# Patient Record
Sex: FEMALE | Race: WHITE | NOT HISPANIC OR LATINO | Employment: OTHER | URBAN - METROPOLITAN AREA
[De-identification: names, ages, dates, MRNs, and addresses within clinical notes are randomized per-mention and may not be internally consistent; named-entity substitution may affect disease eponyms.]

---

## 2017-09-12 ENCOUNTER — CLINICAL SUPPORT (OUTPATIENT)
Dept: AUDIOLOGY | Facility: CLINIC | Age: 82
End: 2017-09-12
Payer: MEDICARE

## 2017-09-12 DIAGNOSIS — H90.3 SENSORINEURAL HEARING LOSS (SNHL), BILATERAL: Primary | ICD-10-CM

## 2017-09-12 PROCEDURE — 92604 REPROGRAM COCHLEAR IMPLT 7/>: CPT | Mod: S$GLB,,, | Performed by: AUDIOLOGIST

## 2017-09-13 NOTE — PROGRESS NOTES
9/12/2017    Cochlear Implant Programming Session:    Sofya Carmen was seen for a follow up programming session with her N5 cochlear implant sound processor.  Mrs. Carmen reported significant problems with the sound processor not turning on every day.  This was leaving her without sound and very concerned that she would not be able to hear.  The processor was found to be in poor condition.  The wires on the cable were rigid and exposed.  The processor was actually working when she arrived but it could not be turned on again.  A loaner processor was issued today and a replacement will be requested from [employer].  The processor has exceeded it's useful life and requires replacement due to all parts in poor condition.    Impedance testing was completed.  There were no changes to her maps at this time.  A replacement system will be requested from [employer].  The features of the N7 were reviewed today.  A loaner processor was programmed today.  Mrs. Carmen will use the loaner processor until a replacement can be obtained.    Recommend:  1.  Following up programming as needed.  2.  Replacement sound processor system.

## 2017-10-24 ENCOUNTER — CLINICAL SUPPORT (OUTPATIENT)
Dept: AUDIOLOGY | Facility: CLINIC | Age: 82
End: 2017-10-24
Payer: MEDICARE

## 2017-10-24 DIAGNOSIS — H90.3 SENSORINEURAL HEARING LOSS (SNHL), BILATERAL: Primary | ICD-10-CM

## 2017-10-24 DIAGNOSIS — H93.293 IMPAIRMENT, AUDITORY DISCRIMINATION, BILATERAL: ICD-10-CM

## 2017-10-24 PROCEDURE — 92603 COCHLEAR IMPLT F/UP EXAM 7/>: CPT | Mod: S$GLB,,, | Performed by: AUDIOLOGIST

## 2017-10-24 NOTE — PROGRESS NOTES
10/24/2017    Cochlear Implant Programming Session:    Sofya Carmen was seen for a programming session to upgrade her sound processor to the [id number] sound processor.  She was using a #2 magnet and a beige sound processor.  The processor # [id number] was issued with TV streamer # [id number] and MINI KETAN # [id number].  There was no remote control with the system and she does not own an iPhone at this time.      Impedance testing was completed.  The sound processor was set with a new map for use with the [id number] sound processor.    P1=SCAN  P2=Everyday    The TV streamer and MINI KETAN were paired to her sound processor.  Mrs. Carmen was counseled on the use of the wireless accessories and the use of the sound processor.  She may need a remote control to make adjustments and access her wireless accessories.     Recommend:  1.  Follow up programming as necessary.  2.  Annual evaluation.

## 2017-12-06 ENCOUNTER — OUTSIDE PLACE OF SERVICE (OUTPATIENT)
Dept: ADMINISTRATIVE | Facility: OTHER | Age: 82
End: 2017-12-06
Payer: MEDICARE

## 2017-12-06 PROCEDURE — 99232 SBSQ HOSP IP/OBS MODERATE 35: CPT | Mod: ,,, | Performed by: THORACIC SURGERY (CARDIOTHORACIC VASCULAR SURGERY)

## 2017-12-08 ENCOUNTER — OUTSIDE PLACE OF SERVICE (OUTPATIENT)
Dept: ADMINISTRATIVE | Facility: OTHER | Age: 82
End: 2017-12-08
Payer: MEDICARE

## 2017-12-08 PROCEDURE — 33508 ENDOSCOPIC VEIN HARVEST: CPT | Mod: ,,, | Performed by: THORACIC SURGERY (CARDIOTHORACIC VASCULAR SURGERY)

## 2017-12-08 PROCEDURE — 33518 CABG ARTERY-VEIN TWO: CPT | Mod: ,,, | Performed by: THORACIC SURGERY (CARDIOTHORACIC VASCULAR SURGERY)

## 2017-12-08 PROCEDURE — 33533 CABG ARTERIAL SINGLE: CPT | Mod: ,,, | Performed by: THORACIC SURGERY (CARDIOTHORACIC VASCULAR SURGERY)

## 2017-12-14 ENCOUNTER — TELEPHONE (OUTPATIENT)
Dept: VASCULAR SURGERY | Facility: CLINIC | Age: 82
End: 2017-12-14

## 2017-12-14 NOTE — TELEPHONE ENCOUNTER
----- Message from Elicia Lopez sent at 12/14/2017  9:11 AM CST -----  Contact: [phone] patient daughter veena purvis   [phone] patient daughter veena purvis   Calling to schedule post op appt

## 2017-12-27 ENCOUNTER — OFFICE VISIT (OUTPATIENT)
Dept: VASCULAR SURGERY | Facility: CLINIC | Age: 82
End: 2017-12-27
Payer: MEDICARE

## 2017-12-27 VITALS
SYSTOLIC BLOOD PRESSURE: 190 MMHG | WEIGHT: 187.19 LBS | DIASTOLIC BLOOD PRESSURE: 82 MMHG | HEART RATE: 82 BPM | BODY MASS INDEX: 27.73 KG/M2 | HEIGHT: 69 IN

## 2017-12-27 DIAGNOSIS — Z95.1 S/P CABG (CORONARY ARTERY BYPASS GRAFT): ICD-10-CM

## 2017-12-27 DIAGNOSIS — I25.10 CORONARY ARTERY DISEASE INVOLVING NATIVE CORONARY ARTERY OF NATIVE HEART WITHOUT ANGINA PECTORIS: ICD-10-CM

## 2017-12-27 PROCEDURE — 99024 POSTOP FOLLOW-UP VISIT: CPT | Mod: S$GLB,,, | Performed by: THORACIC SURGERY (CARDIOTHORACIC VASCULAR SURGERY)

## 2017-12-27 PROCEDURE — 99999 PR PBB SHADOW E&M-EST. PATIENT-LVL III: CPT | Mod: PBBFAC,,, | Performed by: THORACIC SURGERY (CARDIOTHORACIC VASCULAR SURGERY)

## 2017-12-27 RX ORDER — HYDROCODONE BITARTRATE AND ACETAMINOPHEN 5; 325 MG/1; MG/1
1 TABLET ORAL EVERY 6 HOURS PRN
Qty: 20 TABLET | Refills: 0
Start: 2017-12-27 | End: 2018-10-17

## 2017-12-27 RX ORDER — LISINOPRIL 10 MG/1
TABLET ORAL
Status: ON HOLD | COMMUNITY
Start: 2017-12-13 | End: 2019-10-24 | Stop reason: CLARIF

## 2017-12-27 RX ORDER — NEBIVOLOL HYDROCHLORIDE 2.5 MG/1
TABLET ORAL
Status: ON HOLD | COMMUNITY
Start: 2017-12-13 | End: 2019-10-24 | Stop reason: CLARIF

## 2017-12-27 RX ORDER — DOCUSATE SODIUM 100 MG/1
100 CAPSULE, LIQUID FILLED ORAL DAILY
Refills: 0 | Status: ON HOLD | COMMUNITY
Start: 2017-12-13 | End: 2019-10-24 | Stop reason: CLARIF

## 2017-12-27 RX ORDER — ATORVASTATIN CALCIUM 40 MG/1
TABLET, FILM COATED ORAL
Status: ON HOLD | COMMUNITY
Start: 2017-12-13 | End: 2019-10-24 | Stop reason: CLARIF

## 2017-12-27 RX ORDER — HYDROCODONE BITARTRATE AND ACETAMINOPHEN 5; 325 MG/1; MG/1
TABLET ORAL
COMMUNITY
Start: 2017-12-14 | End: 2017-12-27 | Stop reason: SDUPTHER

## 2017-12-27 RX ORDER — GENTAMICIN SULFATE 3 MG/ML
SOLUTION/ DROPS OPHTHALMIC
Status: ON HOLD | COMMUNITY
Start: 2017-12-22 | End: 2019-10-24 | Stop reason: CLARIF

## 2017-12-27 NOTE — PROGRESS NOTES
HISTORY OF PRESENT ILLNESS:  This is an 82-year-old lady status post coronary   artery bypass grafting.  She returns to the office today in followup.  She has   done overall quite well.  She has no major complaints except for occasional pain   at night, but this is relieved with Norco.  Medicines are noted, they are part   of recent EPIC record.  She does take aspirin and 2 antihypertensives.  They are   overseen by the Cardiology Service.    PHYSICAL EXAMINATION:  Her surgical wounds are clean and dry.  There is no   evidence of infection.    I think at this point she is making good progress.  Her clips and sutures were   all removed.  She can see me on an as needed basis.  I would anticipate a good   long-term outlook, but will defer medical management to the Cardiology Service.      EVANGELISTA  dd: 12/27/2017 14:50:53 (CST)  td: 12/28/2017 07:06:01 (CST)  Doc ID   #[id number]  Job ID #[id number]    CC:

## 2018-10-17 ENCOUNTER — OFFICE VISIT (OUTPATIENT)
Dept: VASCULAR SURGERY | Facility: CLINIC | Age: 83
End: 2018-10-17
Payer: MEDICARE

## 2018-10-17 VITALS
WEIGHT: 195.75 LBS | SYSTOLIC BLOOD PRESSURE: 228 MMHG | DIASTOLIC BLOOD PRESSURE: 91 MMHG | BODY MASS INDEX: 28.99 KG/M2 | HEIGHT: 69 IN | HEART RATE: 78 BPM

## 2018-10-17 DIAGNOSIS — Z95.1 S/P CABG (CORONARY ARTERY BYPASS GRAFT): ICD-10-CM

## 2018-10-17 DIAGNOSIS — Z51.89 VISIT FOR WOUND CHECK: Primary | ICD-10-CM

## 2018-10-17 PROCEDURE — 99213 OFFICE O/P EST LOW 20 MIN: CPT | Mod: PBBFAC,PO | Performed by: THORACIC SURGERY (CARDIOTHORACIC VASCULAR SURGERY)

## 2018-10-17 PROCEDURE — 99213 OFFICE O/P EST LOW 20 MIN: CPT | Mod: S$PBB,,, | Performed by: THORACIC SURGERY (CARDIOTHORACIC VASCULAR SURGERY)

## 2018-10-17 PROCEDURE — 3077F SYST BP >= 140 MM HG: CPT | Mod: CPTII,,, | Performed by: THORACIC SURGERY (CARDIOTHORACIC VASCULAR SURGERY)

## 2018-10-17 PROCEDURE — 1101F PT FALLS ASSESS-DOCD LE1/YR: CPT | Mod: CPTII,,, | Performed by: THORACIC SURGERY (CARDIOTHORACIC VASCULAR SURGERY)

## 2018-10-17 PROCEDURE — 3080F DIAST BP >= 90 MM HG: CPT | Mod: CPTII,,, | Performed by: THORACIC SURGERY (CARDIOTHORACIC VASCULAR SURGERY)

## 2018-10-17 PROCEDURE — 99999 PR PBB SHADOW E&M-EST. PATIENT-LVL III: CPT | Mod: PBBFAC,,, | Performed by: THORACIC SURGERY (CARDIOTHORACIC VASCULAR SURGERY)

## 2018-10-17 RX ORDER — DOXYCYCLINE HYCLATE 100 MG
100 TABLET ORAL EVERY 12 HOURS
Refills: 0 | Status: ON HOLD | COMMUNITY
Start: 2018-10-15 | End: 2019-10-24 | Stop reason: CLARIF

## 2018-10-17 RX ORDER — AMLODIPINE BESYLATE 5 MG/1
5 TABLET ORAL NIGHTLY
Refills: 0 | Status: ON HOLD | COMMUNITY
Start: 2018-10-15 | End: 2019-10-24 | Stop reason: HOSPADM

## 2018-10-17 NOTE — PROGRESS NOTES
OFFICE NOTE    This is an 83-year-old lady who had coronary artery bypass grafting in December 2017.  She developed what appeared to be an abscess along the course of her   sternotomy incision.  She was seen in the Emergency Room and underwent incision   and drainage of a very shallow abscess here.  She returns to the office today in   followup.  Medicines are noted.  She has been on doxycycline.  She had a   culture drawn while in the Emergency Room at [facility].  Medicines are   noted.  Her surgical problems are reviewed as well.    PHYSICAL EXAMINATION:  VITAL SIGNS:  Stable.  She is afebrile.  CHEST:  Clear.  HEART:  Regular rate and rhythm.  ABDOMEN:  Benign.  EXTREMITIES:  Femoral pulses are equal.  Along the course of her sternotomy   incision, she has very shallow short incision at about the mid aspect of the   sternotomy incision.  The dressing was removed that had been placed.  There was   a small amount of purulent material expressed from the wound.  It was probed   with a Q-tip and it did not penetrate down deeper than 0.5 cm and certainly it   does not appear there is any sternal bony involvement.    PLAN:  At this point, she should continue her doxycycline, we dressed the wound   again today, she should dress it on a daily basis and keep it clean and dry.  I   told her to call my office if the wound is not any better by next week and we   will reassess the situation.      EVANGELISTA  dd: 10/17/2018 14:55:48 (CDT)  td: 10/18/2018 02:57:32 (CDT)  Doc ID   #[id number]  Job ID #[id number]    CC:

## 2019-08-15 ENCOUNTER — LAB VISIT (OUTPATIENT)
Dept: LAB | Facility: HOSPITAL | Age: 84
End: 2019-08-15
Attending: INTERNAL MEDICINE
Payer: MEDICARE

## 2019-08-15 DIAGNOSIS — T82.898A ARTERIOVENOUS FISTULA OCCLUSION: Primary | ICD-10-CM

## 2019-08-15 DIAGNOSIS — I10 ESSENTIAL HYPERTENSION, MALIGNANT: ICD-10-CM

## 2019-08-15 DIAGNOSIS — G47.9 REPETITIVE INTRUSIONS OF SLEEP: ICD-10-CM

## 2019-08-15 DIAGNOSIS — E78.5 HYPERLIPEMIA: ICD-10-CM

## 2019-08-15 LAB
ALBUMIN SERPL BCP-MCNC: 4.1 G/DL (ref 3.5–5.2)
ALP SERPL-CCNC: 50 U/L (ref 55–135)
ALT SERPL W/O P-5'-P-CCNC: 12 U/L (ref 10–44)
ANION GAP SERPL CALC-SCNC: 10 MMOL/L (ref 8–16)
AST SERPL-CCNC: 16 U/L (ref 10–40)
BILIRUB SERPL-MCNC: 0.7 MG/DL (ref 0.1–1)
BUN SERPL-MCNC: 15 MG/DL (ref 8–23)
CALCIUM SERPL-MCNC: 9.5 MG/DL (ref 8.7–10.5)
CHLORIDE SERPL-SCNC: 105 MMOL/L (ref 95–110)
CHOLEST SERPL-MCNC: 164 MG/DL (ref 120–199)
CHOLEST/HDLC SERPL: 3.6 {RATIO} (ref 2–5)
CO2 SERPL-SCNC: 24 MMOL/L (ref 23–29)
CREAT SERPL-MCNC: 0.7 MG/DL (ref 0.5–1.4)
ERYTHROCYTE [DISTWIDTH] IN BLOOD BY AUTOMATED COUNT: 14.7 % (ref 11.5–14.5)
EST. GFR  (AFRICAN AMERICAN): >60 ML/MIN/1.73 M^2
EST. GFR  (NON AFRICAN AMERICAN): >60 ML/MIN/1.73 M^2
GLUCOSE SERPL-MCNC: 93 MG/DL (ref 70–110)
HCT VFR BLD AUTO: 44.1 % (ref 37–48.5)
HDLC SERPL-MCNC: 46 MG/DL (ref 40–75)
HDLC SERPL: 28 % (ref 20–50)
HGB BLD-MCNC: 14.2 G/DL (ref 12–16)
LDLC SERPL CALC-MCNC: 104.8 MG/DL (ref 63–159)
MCH RBC QN AUTO: 28.6 PG (ref 27–31)
MCHC RBC AUTO-ENTMCNC: 32.2 G/DL (ref 32–36)
MCV RBC AUTO: 89 FL (ref 82–98)
NONHDLC SERPL-MCNC: 118 MG/DL
PLATELET # BLD AUTO: 238 K/UL (ref 150–350)
PMV BLD AUTO: 10.2 FL (ref 9.2–12.9)
POTASSIUM SERPL-SCNC: 4.1 MMOL/L (ref 3.5–5.1)
PROT SERPL-MCNC: 7.3 G/DL (ref 6–8.4)
RBC # BLD AUTO: 4.96 M/UL (ref 4–5.4)
SODIUM SERPL-SCNC: 139 MMOL/L (ref 136–145)
T4 SERPL-MCNC: 7.9 UG/DL (ref 4.5–11.5)
TRIGL SERPL-MCNC: 66 MG/DL (ref 30–150)
TSH SERPL DL<=0.005 MIU/L-ACNC: 3.84 UIU/ML (ref 0.34–5.6)
WBC # BLD AUTO: 6.68 K/UL (ref 3.9–12.7)

## 2019-08-15 PROCEDURE — 85027 COMPLETE CBC AUTOMATED: CPT

## 2019-08-15 PROCEDURE — 84436 ASSAY OF TOTAL THYROXINE: CPT

## 2019-08-15 PROCEDURE — 36415 COLL VENOUS BLD VENIPUNCTURE: CPT

## 2019-08-15 PROCEDURE — 80053 COMPREHEN METABOLIC PANEL: CPT

## 2019-08-15 PROCEDURE — 84443 ASSAY THYROID STIM HORMONE: CPT

## 2019-08-15 PROCEDURE — 80061 LIPID PANEL: CPT

## 2019-10-23 ENCOUNTER — HOSPITAL ENCOUNTER (OUTPATIENT)
Facility: HOSPITAL | Age: 84
Discharge: HOME | End: 2019-10-24
Attending: EMERGENCY MEDICINE | Admitting: INTERNAL MEDICINE
Payer: MEDICARE

## 2019-10-23 DIAGNOSIS — Z95.1 S/P CABG (CORONARY ARTERY BYPASS GRAFT): Primary | ICD-10-CM

## 2019-10-23 DIAGNOSIS — R07.9 CHEST PAIN: ICD-10-CM

## 2019-10-23 DIAGNOSIS — I25.10 ASCVD (ARTERIOSCLEROTIC CARDIOVASCULAR DISEASE): ICD-10-CM

## 2019-10-23 LAB
BASOPHILS # BLD AUTO: 0.06 K/UL (ref 0–0.2)
BASOPHILS NFR BLD: 0.6 % (ref 0–1.9)
DIFFERENTIAL METHOD: ABNORMAL
EOSINOPHIL # BLD AUTO: 0.3 K/UL (ref 0–0.5)
EOSINOPHIL NFR BLD: 2.7 % (ref 0–8)
ERYTHROCYTE [DISTWIDTH] IN BLOOD BY AUTOMATED COUNT: 15.5 % (ref 11.5–14.5)
HCT VFR BLD AUTO: 42.7 % (ref 37–48.5)
HGB BLD-MCNC: 14.1 G/DL (ref 12–16)
IMM GRANULOCYTES # BLD AUTO: 0.05 K/UL (ref 0–0.04)
IMM GRANULOCYTES NFR BLD AUTO: 0.5 % (ref 0–0.5)
INR PPP: 0.9
LYMPHOCYTES # BLD AUTO: 3.1 K/UL (ref 1–4.8)
LYMPHOCYTES NFR BLD: 31.8 % (ref 18–48)
MCH RBC QN AUTO: 30.3 PG (ref 27–31)
MCHC RBC AUTO-ENTMCNC: 33 G/DL (ref 32–36)
MCV RBC AUTO: 92 FL (ref 82–98)
MONOCYTES # BLD AUTO: 0.8 K/UL (ref 0.3–1)
MONOCYTES NFR BLD: 7.6 % (ref 4–15)
NEUTROPHILS # BLD AUTO: 5.6 K/UL (ref 1.8–7.7)
NEUTROPHILS NFR BLD: 56.8 % (ref 38–73)
NRBC BLD-RTO: 0 /100 WBC
PLATELET # BLD AUTO: 229 K/UL (ref 150–350)
PMV BLD AUTO: 10.8 FL (ref 9.2–12.9)
PROTHROMBIN TIME: 11.9 SEC (ref 10.6–14.8)
RBC # BLD AUTO: 4.66 M/UL (ref 4–5.4)
WBC # BLD AUTO: 9.88 K/UL (ref 3.9–12.7)

## 2019-10-23 PROCEDURE — 85025 COMPLETE CBC W/AUTO DIFF WBC: CPT

## 2019-10-23 PROCEDURE — 84439 ASSAY OF FREE THYROXINE: CPT

## 2019-10-23 PROCEDURE — 84443 ASSAY THYROID STIM HORMONE: CPT

## 2019-10-23 PROCEDURE — 84484 ASSAY OF TROPONIN QUANT: CPT

## 2019-10-23 PROCEDURE — 85610 PROTHROMBIN TIME: CPT

## 2019-10-23 PROCEDURE — 80053 COMPREHEN METABOLIC PANEL: CPT

## 2019-10-23 PROCEDURE — 93005 ELECTROCARDIOGRAM TRACING: CPT

## 2019-10-23 PROCEDURE — 25000003 PHARM REV CODE 250: Performed by: STUDENT IN AN ORGANIZED HEALTH CARE EDUCATION/TRAINING PROGRAM

## 2019-10-23 PROCEDURE — 99285 EMERGENCY DEPT VISIT HI MDM: CPT | Mod: 25

## 2019-10-23 PROCEDURE — 83880 ASSAY OF NATRIURETIC PEPTIDE: CPT

## 2019-10-23 RX ORDER — AMLODIPINE BESYLATE 2.5 MG/1
2.5 TABLET ORAL DAILY
Status: ON HOLD | COMMUNITY
End: 2019-10-24 | Stop reason: HOSPADM

## 2019-10-23 RX ORDER — ASPIRIN 325 MG
325 TABLET ORAL
Status: COMPLETED | OUTPATIENT
Start: 2019-10-23 | End: 2019-10-23

## 2019-10-23 RX ADMIN — ASPIRIN 325 MG ORAL TABLET 325 MG: 325 PILL ORAL at 11:10

## 2019-10-24 ENCOUNTER — CLINICAL SUPPORT (OUTPATIENT)
Dept: CARDIOLOGY | Facility: HOSPITAL | Age: 84
End: 2019-10-24
Attending: SPECIALIST
Payer: MEDICARE

## 2019-10-24 ENCOUNTER — HOSPITAL ENCOUNTER (OUTPATIENT)
Dept: RADIOLOGY | Facility: HOSPITAL | Age: 84
Discharge: HOME | End: 2019-10-24
Attending: SPECIALIST
Payer: MEDICARE

## 2019-10-24 VITALS
TEMPERATURE: 98 F | HEART RATE: 62 BPM | OXYGEN SATURATION: 95 % | HEIGHT: 68 IN | DIASTOLIC BLOOD PRESSURE: 76 MMHG | BODY MASS INDEX: 27.43 KG/M2 | RESPIRATION RATE: 18 BRPM | WEIGHT: 181 LBS | SYSTOLIC BLOOD PRESSURE: 157 MMHG

## 2019-10-24 PROBLEM — R07.9 CHEST PAIN: Status: ACTIVE | Noted: 2019-10-24

## 2019-10-24 PROBLEM — R07.9 CHEST PAIN: Status: RESOLVED | Noted: 2019-10-24 | Resolved: 2019-10-24

## 2019-10-24 PROBLEM — I10 HYPERTENSION: Status: ACTIVE | Noted: 2019-10-24

## 2019-10-24 LAB
ALBUMIN SERPL BCP-MCNC: 4.2 G/DL (ref 3.5–5.2)
ALP SERPL-CCNC: 70 U/L (ref 55–135)
ALT SERPL W/O P-5'-P-CCNC: 14 U/L (ref 10–44)
ANION GAP SERPL CALC-SCNC: 13 MMOL/L (ref 8–16)
AORTIC ROOT ANNULUS: 3.12 CM
AORTIC VALVE CUSP SEPERATION: 0.96 CM
AST SERPL-CCNC: 15 U/L (ref 10–40)
AV INDEX (PROSTH): 0.82
AV MEAN GRADIENT: 5 MMHG
AV PEAK GRADIENT: 10 MMHG
AV VALVE AREA: 2.62 CM2
AV VELOCITY RATIO: 85.4
BILIRUB SERPL-MCNC: 0.8 MG/DL (ref 0.1–1)
BNP SERPL-MCNC: 83 PG/ML (ref 0–99)
BSA FOR ECHO PROCEDURE: 1.98 M2
BUN SERPL-MCNC: 27 MG/DL (ref 8–23)
CALCIUM SERPL-MCNC: 8.8 MG/DL (ref 8.7–10.5)
CHLORIDE SERPL-SCNC: 104 MMOL/L (ref 95–110)
CHOLEST SERPL-MCNC: 199 MG/DL (ref 120–199)
CHOLEST/HDLC SERPL: 3.6 {RATIO} (ref 2–5)
CO2 SERPL-SCNC: 24 MMOL/L (ref 23–29)
CREAT SERPL-MCNC: 1 MG/DL (ref 0.5–1.4)
CV ECHO LV RWT: 0.67 CM
CV PHARM DOSE: 0.4 MG
CV STRESS BASE HR: 87 BPM
DIASTOLIC BLOOD PRESSURE: 78 MMHG
DOP CALC AO PEAK VEL: 1.56 M/S
DOP CALC AO VTI: 32.58 CM
DOP CALC LVOT AREA: 3.2 CM2
DOP CALC LVOT DIAMETER: 2.02 CM
DOP CALC LVOT PEAK VEL: 133.22 M/S
DOP CALC LVOT STROKE VOLUME: 85.23 CM3
DOP CALCLVOT PEAK VEL VTI: 26.61 CM
E WAVE DECELERATION TIME: 314.94 MSEC
E/A RATIO: 0.85
E/E' RATIO: 12.4 M/S
ECHO LV POSTERIOR WALL: 1.11 CM (ref 0.6–1.1)
EST. GFR  (AFRICAN AMERICAN): >60 ML/MIN/1.73 M^2
EST. GFR  (NON AFRICAN AMERICAN): 52.6 ML/MIN/1.73 M^2
FRACTIONAL SHORTENING: 37 % (ref 28–44)
GLUCOSE SERPL-MCNC: 96 MG/DL (ref 70–110)
HDLC SERPL-MCNC: 56 MG/DL (ref 40–75)
HDLC SERPL: 28.1 % (ref 20–50)
INTERVENTRICULAR SEPTUM: 1.11 CM (ref 0.6–1.1)
LDLC SERPL CALC-MCNC: 126.6 MG/DL (ref 63–159)
LEFT ATRIUM SIZE: 2.52 CM
LEFT INTERNAL DIMENSION IN SYSTOLE: 2.08 CM (ref 2.1–4)
LEFT VENTRICLE DIASTOLIC VOLUME INDEX: 47.22 ML/M2
LEFT VENTRICLE DIASTOLIC VOLUME: 92.5 ML
LEFT VENTRICLE MASS INDEX: 56 G/M2
LEFT VENTRICLE SYSTOLIC VOLUME INDEX: 16.6 ML/M2
LEFT VENTRICLE SYSTOLIC VOLUME: 32.6 ML
LEFT VENTRICULAR INTERNAL DIMENSION IN DIASTOLE: 3.3 CM (ref 3.5–6)
LEFT VENTRICULAR MASS: 110.64 G
LV LATERAL E/E' RATIO: 10.33 M/S
LV SEPTAL E/E' RATIO: 15.5 M/S
MV PEAK A VEL: 1.1 M/S
MV PEAK E VEL: 0.93 M/S
NONHDLC SERPL-MCNC: 143 MG/DL
OHS CV CPX 85 PERCENT MAX PREDICTED HEART RATE MALE: 114
OHS CV CPX MAX PREDICTED HEART RATE: 134
OHS CV CPX PATIENT IS FEMALE: 1
OHS CV CPX PATIENT IS MALE: 0
OHS CV CPX PEAK DIASTOLIC BLOOD PRESSURE: 45 MMHG
OHS CV CPX PEAK HEAR RATE: 123 BPM
OHS CV CPX PEAK RATE PRESSURE PRODUCT: NORMAL
OHS CV CPX PEAK SYSTOLIC BLOOD PRESSURE: 167 MMHG
OHS CV CPX PERCENT MAX PREDICTED HEART RATE ACHIEVED: 92
OHS CV CPX RATE PRESSURE PRODUCT PRESENTING: 9396
PISA TR MAX VEL: 2.13 M/S
POTASSIUM SERPL-SCNC: 3.7 MMOL/L (ref 3.5–5.1)
PROT SERPL-MCNC: 7.2 G/DL (ref 6–8.4)
PV PEAK VELOCITY: 80.67 CM/S
RA PRESSURE: 3 MMHG
SODIUM SERPL-SCNC: 141 MMOL/L (ref 136–145)
STRESS ECHO POST EXERCISE DUR MIN: 2 MINUTES
STRESS ECHO POST EXERCISE DUR SEC: 15 SECONDS
STRESS ECHO TARGET HR: 117 BPM
SYSTOLIC BLOOD PRESSURE: 108 MMHG
T4 FREE SERPL-MCNC: 1.19 NG/DL (ref 0.71–1.51)
TDI LATERAL: 0.09 M/S
TDI SEPTAL: 0.06 M/S
TDI: 0.08 M/S
TR MAX PG: 18 MMHG
TRIGL SERPL-MCNC: 82 MG/DL (ref 30–150)
TROPONIN I SERPL DL<=0.01 NG/ML-MCNC: <0.03 NG/ML (ref 0.02–0.04)
TSH SERPL DL<=0.005 MIU/L-ACNC: 11.14 UIU/ML (ref 0.34–5.6)
TV REST PULMONARY ARTERY PRESSURE: 21 MMHG

## 2019-10-24 PROCEDURE — G0378 HOSPITAL OBSERVATION PER HR: HCPCS

## 2019-10-24 PROCEDURE — 93017 CV STRESS TEST TRACING ONLY: CPT

## 2019-10-24 PROCEDURE — A9502 TC99M TETROFOSMIN: HCPCS

## 2019-10-24 PROCEDURE — 25000003 PHARM REV CODE 250: Performed by: NURSE PRACTITIONER

## 2019-10-24 PROCEDURE — 93306 TTE W/DOPPLER COMPLETE: CPT

## 2019-10-24 PROCEDURE — 80061 LIPID PANEL: CPT

## 2019-10-24 PROCEDURE — 25000003 PHARM REV CODE 250: Performed by: STUDENT IN AN ORGANIZED HEALTH CARE EDUCATION/TRAINING PROGRAM

## 2019-10-24 PROCEDURE — 36415 COLL VENOUS BLD VENIPUNCTURE: CPT

## 2019-10-24 PROCEDURE — 84484 ASSAY OF TROPONIN QUANT: CPT

## 2019-10-24 PROCEDURE — 25000003 PHARM REV CODE 250: Performed by: SPECIALIST

## 2019-10-24 PROCEDURE — 63600175 PHARM REV CODE 636 W HCPCS: Performed by: SPECIALIST

## 2019-10-24 RX ORDER — NAPROXEN SODIUM 220 MG/1
81 TABLET, FILM COATED ORAL DAILY
Status: DISCONTINUED | OUTPATIENT
Start: 2019-10-24 | End: 2019-10-24 | Stop reason: HOSPADM

## 2019-10-24 RX ORDER — REGADENOSON 0.08 MG/ML
0.4 INJECTION, SOLUTION INTRAVENOUS ONCE
Status: COMPLETED | OUTPATIENT
Start: 2019-10-24 | End: 2019-10-24

## 2019-10-24 RX ORDER — PANTOPRAZOLE SODIUM 40 MG/1
40 TABLET, DELAYED RELEASE ORAL DAILY
Status: DISCONTINUED | OUTPATIENT
Start: 2019-10-24 | End: 2019-10-24 | Stop reason: HOSPADM

## 2019-10-24 RX ORDER — NITROGLYCERIN 0.4 MG/1
0.4 TABLET SUBLINGUAL EVERY 5 MIN PRN
Qty: 30 TABLET | Refills: 1 | Status: SHIPPED | OUTPATIENT
Start: 2019-10-24 | End: 2021-03-09

## 2019-10-24 RX ORDER — ACETAMINOPHEN 325 MG/1
650 TABLET ORAL EVERY 4 HOURS PRN
Status: DISCONTINUED | OUTPATIENT
Start: 2019-10-24 | End: 2019-10-24 | Stop reason: HOSPADM

## 2019-10-24 RX ORDER — AMLODIPINE BESYLATE 2.5 MG/1
2.5 TABLET ORAL DAILY
Status: DISCONTINUED | OUTPATIENT
Start: 2019-10-24 | End: 2019-10-24 | Stop reason: HOSPADM

## 2019-10-24 RX ORDER — NITROGLYCERIN 0.4 MG/1
0.4 TABLET SUBLINGUAL EVERY 5 MIN PRN
Status: DISCONTINUED | OUTPATIENT
Start: 2019-10-24 | End: 2019-10-24 | Stop reason: HOSPADM

## 2019-10-24 RX ORDER — ONDANSETRON 4 MG/1
8 TABLET, ORALLY DISINTEGRATING ORAL EVERY 8 HOURS PRN
Status: DISCONTINUED | OUTPATIENT
Start: 2019-10-24 | End: 2019-10-24 | Stop reason: HOSPADM

## 2019-10-24 RX ORDER — MORPHINE SULFATE 2 MG/ML
2 INJECTION, SOLUTION INTRAMUSCULAR; INTRAVENOUS EVERY 4 HOURS PRN
Status: DISCONTINUED | OUTPATIENT
Start: 2019-10-24 | End: 2019-10-24 | Stop reason: HOSPADM

## 2019-10-24 RX ORDER — SODIUM CHLORIDE 0.9 % (FLUSH) 0.9 %
10 SYRINGE (ML) INJECTION
Status: DISCONTINUED | OUTPATIENT
Start: 2019-10-24 | End: 2019-10-24 | Stop reason: HOSPADM

## 2019-10-24 RX ORDER — CLOPIDOGREL BISULFATE 75 MG/1
75 TABLET ORAL DAILY
Qty: 30 TABLET | Refills: 1 | Status: SHIPPED | OUTPATIENT
Start: 2019-10-24 | End: 2020-01-14 | Stop reason: CLARIF

## 2019-10-24 RX ORDER — ONDANSETRON 2 MG/ML
4 INJECTION INTRAMUSCULAR; INTRAVENOUS EVERY 8 HOURS PRN
Status: DISCONTINUED | OUTPATIENT
Start: 2019-10-24 | End: 2019-10-24 | Stop reason: HOSPADM

## 2019-10-24 RX ORDER — AMLODIPINE BESYLATE 5 MG/1
5 TABLET ORAL 2 TIMES DAILY
Qty: 60 TABLET | Refills: 1 | Status: SHIPPED | OUTPATIENT
Start: 2019-10-24 | End: 2020-09-08

## 2019-10-24 RX ORDER — NAPROXEN SODIUM 220 MG/1
81 TABLET, FILM COATED ORAL DAILY
Qty: 30 TABLET | Refills: 1 | Status: SHIPPED | OUTPATIENT
Start: 2019-10-24 | End: 2021-03-09

## 2019-10-24 RX ORDER — HYDRALAZINE HYDROCHLORIDE 20 MG/ML
10 INJECTION INTRAMUSCULAR; INTRAVENOUS EVERY 6 HOURS PRN
Status: DISCONTINUED | OUTPATIENT
Start: 2019-10-24 | End: 2019-10-24 | Stop reason: HOSPADM

## 2019-10-24 RX ORDER — AMLODIPINE BESYLATE 5 MG/1
5 TABLET ORAL NIGHTLY
Status: DISCONTINUED | OUTPATIENT
Start: 2019-10-24 | End: 2019-10-24 | Stop reason: HOSPADM

## 2019-10-24 RX ADMIN — REGADENOSON 0.4 MG: 0.08 INJECTION, SOLUTION INTRAVENOUS at 11:10

## 2019-10-24 RX ADMIN — ACETAMINOPHEN 650 MG: 325 TABLET ORAL at 12:10

## 2019-10-24 RX ADMIN — ALUMINUM HYDROXIDE, MAGNESIUM HYDROXIDE, AND SIMETHICONE 50 ML: 200; 200; 20 SUSPENSION ORAL at 12:10

## 2019-10-24 RX ADMIN — PANTOPRAZOLE SODIUM 40 MG: 40 TABLET, DELAYED RELEASE ORAL at 12:10

## 2019-10-24 RX ADMIN — AMLODIPINE BESYLATE 2.5 MG: 2.5 TABLET ORAL at 12:10

## 2019-10-24 RX ADMIN — AMLODIPINE BESYLATE 5 MG: 5 TABLET ORAL at 03:10

## 2019-10-24 NOTE — ED NOTES
"Patient identifiers for Sofya Carmen checked and correct.  LOC:  Sofya Carmen is awake, alert, and aware of environment with an appropriate affect. She is oriented x 3 and speaking appropriately.  APPEARANCE:  She is resting comfortably and in no acute distress. She is clean and well groomed, patient's clothing is properly fastened.  SKIN:  The skin is warm and dry. She has normal skin turgor and moist mucus membranes. Skin is intact; no bruising or breakdown noted.  MUSCULOSKELETAL:  She is moving all extremities well, no obvious deformities noted. Pulses intact.   RESPIRATORY:  Airway is open and patent. Respirations are spontaneous and non-labored with normal effort and rate.  CARDIAC:  She has a normal rate and rhythm with PVCs. Capillary refill < 3 seconds.Pt c/o indigestion prior to arrival. Pt states that "her mom felt the same thing before she  of her massive MI."  ABDOMEN:  No distention noted.  Soft and non-tender upon palpation.  NEUROLOGICAL:  PERRL. Facial expression is symmetrical. Hand grasps are equal bilaterally. Normal sensation in all extremities when touched with finger.  Allergies reported:    Review of patient's allergies indicates:   Allergen Reactions    [place] thyroid  [thyroid (pork)]      Other reaction(s): palpitations     OTHER NOTES:    "

## 2019-10-24 NOTE — SUBJECTIVE & OBJECTIVE
Past Medical History:   Diagnosis Date    Hearing loss, left     White coat hypertension        Past Surgical History:   Procedure Laterality Date    BUNIONECTOMY      EYE SURGERY      cataract    HEMORRHOID SURGERY      SHOULDER SURGERY         Review of patient's allergies indicates:   Allergen Reactions    [place] thyroid  [thyroid (pork)]      Other reaction(s): palpitations       No current facility-administered medications on file prior to encounter.      Current Outpatient Medications on File Prior to Encounter   Medication Sig    amLODIPine (NORVASC) 2.5 MG tablet Take 2.5 mg by mouth once daily.    amLODIPine (NORVASC) 5 MG tablet Take 5 mg by mouth nightly.     atorvastatin (LIPITOR) 40 MG tablet     BYSTOLIC 2.5 mg Tab     docusate sodium (COLACE) 100 MG capsule Take 100 mg by mouth once daily.    doxycycline (VIBRA-TABS) 100 MG tablet Take 100 mg by mouth every 12 (twelve) hours.    gentamicin (GARAMYCIN) 0.3 % ophthalmic solution     lisinopril 10 MG tablet      Family History     Problem Relation (Age of Onset)    Heart disease Mother, Father        Tobacco Use    Smoking status: Never Smoker   Substance and Sexual Activity    Alcohol use: No     Comment: occasionally    Drug use: No    Sexual activity: Not on file     Review of Systems   All other systems reviewed and are negative.    Objective:     Vital Signs (Most Recent):  Temp: 98.5 °F (36.9 °C) (10/23/19 2338)  Pulse: 80 (10/23/19 2338)  Resp: 16 (10/23/19 2338)  BP: (!) 172/74 (10/23/19 2338)  SpO2: 96 % (10/23/19 2338) Vital Signs (24h Range):  Temp:  [98.5 °F (36.9 °C)] 98.5 °F (36.9 °C)  Pulse:  [80] 80  Resp:  [16] 16  SpO2:  [96 %] 96 %  BP: (172)/(74) 172/74     Weight: 81.6 kg (180 lb)  Body mass index is 27.37 kg/m².    Physical Exam   Constitutional: She is oriented to person, place, and time. She appears well-developed and well-nourished. No distress.   HENT:   Head: Normocephalic and atraumatic.   Eyes: Conjunctivae  and EOM are normal.   Neck: Normal range of motion.   Cardiovascular: Normal rate and regular rhythm. Exam reveals no gallop.   No murmur heard.  Pulmonary/Chest: Effort normal and breath sounds normal. She has no wheezes. She has no rales.   Abdominal: Soft.   Genitourinary: Vagina normal.   Musculoskeletal: Normal range of motion. She exhibits no edema or deformity.   Neurological: She is alert and oriented to person, place, and time.   Skin: Skin is warm and dry. Capillary refill takes less than 2 seconds. No rash noted. She is not diaphoretic. No erythema.   Psychiatric: She has a normal mood and affect.   Vitals reviewed.        CRANIAL NERVES     CN III, IV, VI   Extraocular motions are normal.        Significant Labs:   CBC:   Recent Labs   Lab 10/23/19  2327   WBC 9.88   HGB 14.1   HCT 42.7        CMP:   Recent Labs   Lab 10/23/19  2327      K 3.7      CO2 24   GLU 96   BUN 27*   CREATININE 1.0   CALCIUM 8.8   PROT 7.2   ALBUMIN 4.2   BILITOT 0.8   ALKPHOS 70   AST 15   ALT 14   ANIONGAP 13   EGFRNONAA 52.6*     Cardiac Markers:   Recent Labs   Lab 10/23/19  2327   BNP 83     Troponin:   Recent Labs   Lab 10/23/19  2327   TROPONINI <0.030     All pertinent labs within the past 24 hours have been reviewed.    Significant Imaging: I have reviewed and interpreted all pertinent imaging results/findings within the past 24 hours. CXR: No obvious infiltrates, no cardiomegaly, or overt heart failure.    EKG, personally reviewed: SR w frequent PVCs, no ST elevation

## 2019-10-24 NOTE — PLAN OF CARE
10/24/19 1633   GIFFORD Message   Medicare Outpatient and Observation Notification regarding financial responsibility Given to patient/caregiver;Explained to patient/caregiver;Signed/date by patient/caregiver   Date GIFFORD was signed 10/24/19   Time GIFFORD was signed 1600   Patient and  do not agree with being in outpatient/observation status and not being informed before she was placed in a bed. They believe that they should have been given the info about her financial responsibility with outpatient/observation.

## 2019-10-24 NOTE — CONSULTS
[facility]  Cardiology  Consult Note    Patient Name: Sofya Carmen  MRN: [id number]  Admission Date: 10/23/2019  Hospital Length of Stay: 0 days  Code Status: Full Code   Attending Provider: Jean Trivedi MD   Consulting Provider: Joshua Streeter MD  Primary Care Physician: Primary Doctor No  Principal Problem:Chest pain    Patient information was obtained from patient and ER records.     Consults  Subjective:     Chief Complaint:  Chest pain      HPI: epigastric substernal burning pain to throat  Last pm and she came to  ER    pvc noted no acute cghanges ; Pt has   om1 + 3  cabg with vein graft 2 years ago  And jessica to lad-  No Est since   She works hard without cp; bp  Elevated at home .  Not on BB, statin , ace  Or asa due to side effects  ; non smoker     Past Medical History:   Diagnosis Date    Hearing loss, left     White coat hypertension        Past Surgical History:   Procedure Laterality Date    BUNIONECTOMY      EYE SURGERY      cataract    HEMORRHOID SURGERY      SHOULDER SURGERY         Review of patient's allergies indicates:   Allergen Reactions    [place] thyroid  [thyroid (pork)]      Other reaction(s): palpitations       No current facility-administered medications on file prior to encounter.      Current Outpatient Medications on File Prior to Encounter   Medication Sig    amLODIPine (NORVASC) 2.5 MG tablet Take 2.5 mg by mouth once daily.    amLODIPine (NORVASC) 5 MG tablet Take 5 mg by mouth nightly.     [DISCONTINUED] atorvastatin (LIPITOR) 40 MG tablet     [DISCONTINUED] BYSTOLIC 2.5 mg Tab     [DISCONTINUED] docusate sodium (COLACE) 100 MG capsule Take 100 mg by mouth once daily.    [DISCONTINUED] doxycycline (VIBRA-TABS) 100 MG tablet Take 100 mg by mouth every 12 (twelve) hours.    [DISCONTINUED] gentamicin (GARAMYCIN) 0.3 % ophthalmic solution     [DISCONTINUED] lisinopril 10 MG tablet      Family History     Problem Relation (Age of Onset)    Heart disease  Mother, Father        Tobacco Use    Smoking status: Never Smoker   Substance and Sexual Activity    Alcohol use: No     Comment: occasionally    Drug use: No    Sexual activity: Not on file     ROS  Objective:     Vital Signs (Most Recent):  Temp: 98 °F (36.7 °C) (10/24/19 0826)  Pulse: 76 (10/24/19 0826)  Resp: 19 (10/24/19 0826)  BP: (!) 168/75 (10/24/19 0826)  SpO2: 97 % (10/24/19 0826) Vital Signs (24h Range):  Temp:  [97.4 °F (36.3 °C)-98.5 °F (36.9 °C)] 98 °F (36.7 °C)  Pulse:  [60-86] 76  Resp:  [11-19] 19  SpO2:  [95 %-97 %] 97 %  BP: (140-172)/(74-82) 168/75     Weight: 82.1 kg (181 lb)  Body mass index is 27.52 kg/m².    SpO2: 97 %  O2 Device (Oxygen Therapy): room air      Intake/Output Summary (Last 24 hours) at 10/24/2019 1036  Last data filed at 10/24/2019 0600  Gross per 24 hour   Intake 100 ml   Output --   Net 100 ml       Lines/Drains/Airways     Peripheral Intravenous Line                 Peripheral IV - Single Lumen 10/23/19 2328 18 G Left Antecubital less than 1 day                Physical Exam 17-/ 80 bilat x 3  160/80  Left supine frequent premature contractions    neck no bruits lungs clear cor reg  irreg no  m abd + extremities ok  Good pulses     Significant Labs:   CMP   Recent Labs   Lab 10/23/19  2327      K 3.7      CO2 24   GLU 96   BUN 27*   CREATININE 1.0   CALCIUM 8.8   PROT 7.2   ALBUMIN 4.2   BILITOT 0.8   ALKPHOS 70   AST 15   ALT 14   ANIONGAP 13   ESTGFRAFRICA >60.0   EGFRNONAA 52.6*   , CBC   Recent Labs   Lab 10/23/19  2327   WBC 9.88   HGB 14.1   HCT 42.7       and Troponin   Recent Labs   Lab 10/23/19  2327 10/24/19  0549   TROPONINI <0.030 <0.030       Significant Imaging: EKG: pvcs  nsr  inf lat st flattening   Assessment and Plan:   1) ro svg pbs -est and echo today   2) gastritis   3) pvcs    ? Hi bp causing issues   She may need cath   Active Diagnoses:    Diagnosis Date Noted POA    PRINCIPAL PROBLEM:  Chest pain [R07.9] 10/24/2019 Yes     Hypertension [I10] 10/24/2019 Yes      Problems Resolved During this Admission:   I personally reviewed chart and imaging studies ,examined patient, and discussed with the patient her illness and treatment including diet and follow-up care. This consult was prolonged and required approximately 50% time for review and 50% time examining patient and writing notes and orders       VTE Risk Mitigation (From admission, onward)         Ordered     Place sequential compression device  Until discontinued      10/24/19 0118     Reason for No Pharmacological VTE Prophylaxis  Once      10/24/19 0118     IP VTE HIGH RISK PATIENT  Once      10/24/19 0118                Thank you for your consult.     Joshua Streeter MD  Cardiology   [facility]

## 2019-10-24 NOTE — H&P
[facility]  History & Physical  Date of service: 10/24/2019 at 0100    Patient Name: Sofya Carmen  MRN: [id number]  Admission Date: 10/23/2019  Attending Physician: Kaushal Pope MD  Primary Care Provider: Primary Doctor No         Patient information was obtained from patient, past medical records and ER records.     Subjective:     Principal Problem:Chest pain    Chief Complaint:   Chief Complaint   Patient presents with    Chest Pain        HPI: The patient is an 84-year-old female with strong family history of CAD and personal history of CAD/MI- status post CABG in 2017 and hypertension.  She presented to the emergency department with epigastric pain. She states that she had mid epigastric pain, described as heartburn, radiated to right jaw, lasting for a few seconds a couple days ago.  Last night, while watching TV, she experienced severe heart burn/bloating in the epigastric area. She took baking soda water and aspirin with no significant improvement. She subsequently came to the ED. She states that this pain is similar to pain with her last heart attack. She denies shortness of breath, nausea, vomiting, diaphoresis.  She denies radiation or aggravating factors.  Pain lasted for a couple hours and now completely resolved.    Workup in the emergency department was unremarkable.  EKG shows sinus rhythm with frequent PVCs, no ST elevation.  Troponin is negative.  She is noted to be hypertensive with systolic blood pressure in the 170s to 190s.  She states that her blood pressure has been normal at home with her amlodipine.  She states that she is not taking aspirin on a regular basis due to bleeding in her eyes.             Past Medical History:   Diagnosis Date    Hearing loss, left     White coat hypertension        Past Surgical History:   Procedure Laterality Date    BUNIONECTOMY      EYE SURGERY      cataract    HEMORRHOID SURGERY      SHOULDER SURGERY         Review of  patient's allergies indicates:   Allergen Reactions    [place] thyroid  [thyroid (pork)]      Other reaction(s): palpitations       No current facility-administered medications on file prior to encounter.      Current Outpatient Medications on File Prior to Encounter   Medication Sig    amLODIPine (NORVASC) 2.5 MG tablet Take 2.5 mg by mouth once daily.    amLODIPine (NORVASC) 5 MG tablet Take 5 mg by mouth nightly.     atorvastatin (LIPITOR) 40 MG tablet     BYSTOLIC 2.5 mg Tab     docusate sodium (COLACE) 100 MG capsule Take 100 mg by mouth once daily.    doxycycline (VIBRA-TABS) 100 MG tablet Take 100 mg by mouth every 12 (twelve) hours.    gentamicin (GARAMYCIN) 0.3 % ophthalmic solution     lisinopril 10 MG tablet      Family History     Problem Relation (Age of Onset)    Heart disease Mother, Father        Tobacco Use    Smoking status: Never Smoker   Substance and Sexual Activity    Alcohol use: No     Comment: occasionally    Drug use: No    Sexual activity: Not on file     Review of Systems   All other systems reviewed and are negative.    Objective:     Vital Signs (Most Recent):  Temp: 98.5 °F (36.9 °C) (10/23/19 2338)  Pulse: 80 (10/23/19 2338)  Resp: 16 (10/23/19 2338)  BP: (!) 172/74 (10/23/19 2338)  SpO2: 96 % (10/23/19 2338) Vital Signs (24h Range):  Temp:  [98.5 °F (36.9 °C)] 98.5 °F (36.9 °C)  Pulse:  [80] 80  Resp:  [16] 16  SpO2:  [96 %] 96 %  BP: (172)/(74) 172/74     Weight: 81.6 kg (180 lb)  Body mass index is 27.37 kg/m².    Physical Exam   Constitutional: She is oriented to person, place, and time. She appears well-developed and well-nourished. No distress.   HENT:   Head: Normocephalic and atraumatic.   Eyes: Conjunctivae and EOM are normal.   Neck: Normal range of motion.   Cardiovascular: Normal rate and regular rhythm. Exam reveals no gallop.   No murmur heard.  Pulmonary/Chest: Effort normal and breath sounds normal. She has no wheezes. She has no rales.   Abdominal: Soft.    Genitourinary: Vagina normal.   Musculoskeletal: Normal range of motion. She exhibits no edema or deformity.   Neurological: She is alert and oriented to person, place, and time.   Skin: Skin is warm and dry. Capillary refill takes less than 2 seconds. No rash noted. She is not diaphoretic. No erythema.   Psychiatric: She has a normal mood and affect.   Vitals reviewed.        CRANIAL NERVES     CN III, IV, VI   Extraocular motions are normal.        Significant Labs:   CBC:   Recent Labs   Lab 10/23/19  2327   WBC 9.88   HGB 14.1   HCT 42.7        CMP:   Recent Labs   Lab 10/23/19  2327      K 3.7      CO2 24   GLU 96   BUN 27*   CREATININE 1.0   CALCIUM 8.8   PROT 7.2   ALBUMIN 4.2   BILITOT 0.8   ALKPHOS 70   AST 15   ALT 14   ANIONGAP 13   EGFRNONAA 52.6*     Cardiac Markers:   Recent Labs   Lab 10/23/19  2327   BNP 83     Troponin:   Recent Labs   Lab 10/23/19  2327   TROPONINI <0.030     All pertinent labs within the past 24 hours have been reviewed.    Significant Imaging: I have reviewed and interpreted all pertinent imaging results/findings within the past 24 hours. CXR: No obvious infiltrates, no cardiomegaly, or overt heart failure.    EKG, personally reviewed: SR w frequent PVCs, no ST elevation    Assessment/Plan:     * Chest pain  Cardiac monitor for arrhythmia  Trend cardiac enzymes  EKG p.r.n.  Add aspirin 81 mg daily  Nitroglycerin p.r.n.  Consult Cardiology      Hypertension  Monitor  Resume home medication, amlodipine 2.5 mg in a.m. and 5 mg in p.m.  Hydralazine 10 mg IV p.r.n. for systolic blood pressure above 180      VTE Risk Mitigation (From admission, onward)         Ordered     Place sequential compression device  Until discontinued      10/24/19 0118     Reason for No Pharmacological VTE Prophylaxis  Once      10/24/19 0118     IP VTE HIGH RISK PATIENT  Once      10/24/19 0118                   ANNY Trejo  Department of Hospital Medicine   [facility]  [facility]

## 2019-10-24 NOTE — PROGRESS NOTES
@  10:52      PATIENT INJECTED WITH      10mCi Tc99m MYOVIEW IV FOR RESTING IMAGES.    REMAIN NPO STATUS.                     S.C.,JAZMINMT

## 2019-10-24 NOTE — ASSESSMENT & PLAN NOTE
Monitor  Resume home medication, amlodipine 2.5 mg in a.m. and 5 mg in p.m.  Hydralazine 10 mg IV p.r.n. for systolic blood pressure above 180

## 2019-10-24 NOTE — PROGRESS NOTES
@    13:20    OBTAINED STRESS IMAGES.    @ 14:00       NUCLEAR MEDICINE MYOPERFUSION STUDY COMPLETED.    HANY JIMENEZ

## 2019-10-24 NOTE — NURSING
Contacted charge nurse to make aware that pt had complaints about observation status and had many questions/concerns about not being informed in ER upon admission about observation status. Charge nurse states that CM would be best person to answer pts questions, CM states that she has already spoke with family and patient and has already answered questions, CM states to call pt advocate. Pt advocate called who states that there is nothing she is able to do and that directors/supervisors should be contacted, pt advocate states that she will call to cardiology director/supervisors to come speak with pt. Pt and family made aware that advocate and charge nurse were made aware and that supervisors are being contacted to come speak to them.

## 2019-10-24 NOTE — ED PROVIDER NOTES
Encounter Date: 10/23/2019       History     Chief Complaint   Patient presents with    Chest Pain     Chief complaint is indigestion type epigastric pain.  The patient states I felt like this before when I had my previous heart attack the patient yesterday had slight pain lower sternum radiate to the jaw lasting 30 sec a burning sensation.  Today she had a similar complaint took aspirin 1 baby aspirin today.  It was described as bad indigestion she also felt a  bad feeling in her throat and felt a  bloating feeling.  She stated it is almost gone at this point time that started several hours prior to arrival.  She does have a history of bypass surgery 2 years ago.  As I mentioned she took 1 baby aspirin tonight.  She does have a history of hypertension.  No history of diabetes high cholesterol.  She is a nonsmoker.  Family history mom  of an MI at 50 dad  after having his 2nd bypass.        Review of patient's allergies indicates:   Allergen Reactions    [place] thyroid  [thyroid (pork)]      Other reaction(s): palpitations     Past Medical History:   Diagnosis Date    Hearing loss, left     White coat hypertension      Past Surgical History:   Procedure Laterality Date    BUNIONECTOMY      EYE SURGERY      cataract    HEMORRHOID SURGERY      SHOULDER SURGERY       No family history on file.  Social History     Tobacco Use    Smoking status: Never Smoker   Substance Use Topics    Alcohol use: No     Comment: occasionally    Drug use: No     Review of Systems   Constitutional: Negative for chills and fever.   HENT: Negative for ear pain, rhinorrhea and sore throat.    Eyes: Negative for pain and visual disturbance.   Respiratory: Negative for cough and shortness of breath.    Cardiovascular: Positive for chest pain. Negative for palpitations.   Gastrointestinal: Negative for abdominal pain, constipation, diarrhea, nausea and vomiting.   Genitourinary: Negative for dysuria, frequency, hematuria  and urgency.   Musculoskeletal: Negative for back pain, joint swelling and myalgias.   Skin: Negative for rash.   Neurological: Negative for dizziness, seizures, weakness and headaches.   Psychiatric/Behavioral: Negative for dysphoric mood. The patient is not nervous/anxious.        Physical Exam     Initial Vitals   BP Pulse Resp Temp SpO2   -- -- -- -- --      MAP       --         Physical Exam    Nursing note and vitals reviewed.  Constitutional: She appears well-developed and well-nourished.   HENT:   Head: Normocephalic and atraumatic.   Eyes: Conjunctivae, EOM and lids are normal. Pupils are equal, round, and reactive to light.   Neck: Trachea normal and normal range of motion. Neck supple. No thyroid mass and no thyromegaly present.   Cardiovascular: Normal rate, regular rhythm and normal heart sounds.   Pulmonary/Chest: Effort normal and breath sounds normal.   Abdominal: Soft. Normal appearance and bowel sounds are normal. There is no tenderness.   Musculoskeletal: Normal range of motion.   Neurological: She is alert and oriented to person, place, and time. She has normal strength and normal reflexes. No cranial nerve deficit or sensory deficit.   Skin: Skin is warm and dry.   Psychiatric: She has a normal mood and affect. Her speech is normal and behavior is normal. Judgment and thought content normal.         ED Course   Procedures  Labs Reviewed   CBC W/ AUTO DIFFERENTIAL   COMPREHENSIVE METABOLIC PANEL   TROPONIN I   B-TYPE NATRIURETIC PEPTIDE   PROTIME-INR   TSH          Imaging Results          X-Ray Chest AP Portable (In process)                               Attending Attestation:             Attending ED Notes:   The patient labs were reviewed.  I spoke to the hospitalist at 12:55 a.m. and the patient will be admitted.  I spoke to Ms. Rudolph.              Clinical Impression:       ICD-10-CM ICD-9-CM   1. Chest pain R07.9 786.50                                Gisell Cohen MD  10/24/19 0056

## 2019-10-24 NOTE — HPI
The patient is an 84-year-old female with strong family history of CAD and personal history of CAD/MI- status post CABG in 2017 and hypertension.  She presented to the emergency department with epigastric pain. She states that she had mid epigastric pain, described as heartburn, radiated to right jaw, lasting for a few seconds a couple days ago.  Last night, while watching TV, she experienced severe heart burn/bloating in the epigastric area. She took baking soda water and aspirin with no significant improvement. She subsequently came to the ED. She states that this pain is similar to pain with her last heart attack. She denies shortness of breath, nausea, vomiting, diaphoresis.  She denies radiation or aggravating factors.  Pain lasted for a couple hours and now completely resolved.    Workup in the emergency department was unremarkable.  EKG shows sinus rhythm with frequent PVCs, no ST elevation.  Troponin is negative.  She is noted to be hypertensive with systolic blood pressure in the 170s to 190s.  She states that her blood pressure has been normal at home with her amlodipine.  She states that she is not taking aspirin on a regular basis due to bleeding in her eyes.

## 2019-10-24 NOTE — PROGRESS NOTES
@ 12:00    PATIENT INJECTED WITH      26mCi Tc99m MYOVIEW FOR STRESS IMAGES.  LEXISCAN STRESS ADMIN WHILE PATIENT ON TREADMILL STRESS.    MODIFIED CAMELIA STRESS    RELEASE NPO STATUS FROM NUCLEAR MEDICINE.    S.C.,[facility]

## 2019-10-24 NOTE — CONSULTS
[facility]  Cardiology  Progress Note    Patient Name: Sofya Carmen  MRN: [id number]  Admission Date: 10/23/2019  Hospital Length of Stay: 0 days  Code Status: Full Code   Attending Physician: Jean Trivedi MD   Primary Care Physician: Primary Doctor No  Expected Discharge Date: 10/24/2019  Principal Problem:Chest pain    Subjective:     Hospital Course: mpi stress   Normal perfusion but  Transient ischemic dilitation 1.8 ( suggest  Ischemia   Interval History:   ROS  Objective:     Vital Signs (Most Recent):  Temp: 97.9 °F (36.6 °C) (10/24/19 1725)  Pulse: 62 (10/24/19 1725)  Resp: 18 (10/24/19 1725)  BP: (!) 157/76 (10/24/19 1725)  SpO2: 95 % (10/24/19 1725) Vital Signs (24h Range):  Temp:  [97.4 °F (36.3 °C)-98.5 °F (36.9 °C)] 97.9 °F (36.6 °C)  Pulse:  [60-86] 62  Resp:  [11-19] 18  SpO2:  [95 %-98 %] 95 %  BP: (140-172)/(74-84) 157/76     Weight: 82.1 kg (181 lb)  Body mass index is 27.52 kg/m².    SpO2: 95 %  O2 Device (Oxygen Therapy): room air      Intake/Output Summary (Last 24 hours) at 10/24/2019 1852  Last data filed at 10/24/2019 1200  Gross per 24 hour   Intake 340 ml   Output --   Net 340 ml       Lines/Drains/Airways     Peripheral Intravenous Line                 Peripheral IV - Single Lumen 10/23/19 2328 18 G Left Antecubital less than 1 day                Physical Exam    Significant Labs:   CMP   Recent Labs   Lab 10/23/19  2327      K 3.7      CO2 24   GLU 96   BUN 27*   CREATININE 1.0   CALCIUM 8.8   PROT 7.2   ALBUMIN 4.2   BILITOT 0.8   ALKPHOS 70   AST 15   ALT 14   ANIONGAP 13   ESTGFRAFRICA >60.0   EGFRNONAA 52.6*   , CBC   Recent Labs   Lab 10/23/19  2327   WBC 9.88   HGB 14.1   HCT 42.7       and Lipid Panel   Recent Labs   Lab 10/24/19  1422   CHOL 199   HDL 56   LDLCALC 126.6   TRIG 82   CHOLHDL 28.1       Significant Imaging:  Assessment and Plan:    pt does not want to take meds  But after long discussion with her and her son, she agrees to try  plavix ( side effects from asa ) + increase norvasc 5 bid and tng prn and lo chol vegetarian diet    F/u clinic 1-3 weeks  Watch bp   Brief HPI:     Active Diagnoses:    Diagnosis Date Noted POA    Hypertension [I10] 10/24/2019 Yes      Problems Resolved During this Admission:    Diagnosis Date Noted Date Resolved POA    PRINCIPAL PROBLEM:  Chest pain [R07.9] 10/24/2019 10/24/2019 Yes       VTE Risk Mitigation (From admission, onward)         Ordered     Place sequential compression device  Until discontinued      10/24/19 0118     Reason for No Pharmacological VTE Prophylaxis  Once      10/24/19 0118     IP VTE HIGH RISK PATIENT  Once      10/24/19 0118                Joshua Streeter MD  Cardiology  [facility]

## 2019-10-24 NOTE — NURSING
Pt and family remain upset that pt admitted to observation status, supervisor/director/advocate never came to speak with pt. Dr. Trivedi made aware that pt would now like to leave. MD at bedside to speak with pt.

## 2019-10-25 NOTE — DISCHARGE SUMMARY
[facility] Medicine  Discharge Summary      Patient Name: Sofya Carmen  MRN: [id number]  Admission Date: 10/23/2019  Hospital Length of Stay: 0 days  Discharge Date and Time: No discharge date for patient encounter.  Attending Physician: Jean Trivedi MD   Discharging Provider: Jean Trivedi MD  Primary Care Provider: Primary Doctor No      HPI:   The patient is an 84-year-old female with strong family history of CAD and personal history of CAD/MI- status post CABG in 2017 and hypertension.  She presented to the emergency department with epigastric pain. She states that she had mid epigastric pain, described as heartburn, radiated to right jaw, lasting for a few seconds a couple days ago.  Last night, while watching TV, she experienced severe heart burn/bloating in the epigastric area. She took baking soda water and aspirin with no significant improvement. She subsequently came to the ED. She states that this pain is similar to pain with her last heart attack. She denies shortness of breath, nausea, vomiting, diaphoresis.  She denies radiation or aggravating factors.  Pain lasted for a couple hours and now completely resolved.    Workup in the emergency department was unremarkable.  EKG shows sinus rhythm with frequent PVCs, no ST elevation.  Troponin is negative.  She is noted to be hypertensive with systolic blood pressure in the 170s to 190s.  She states that her blood pressure has been normal at home with her amlodipine.  She states that she is not taking aspirin on a regular basis due to bleeding in her eyes.             * No surgery found *      Hospital Course:   Patient admitted with chest pain.  Serial JAZ and stress test negative for ischemia. Patient with cabg in 2017.  Seen by Dr. Streeter who discussed adding plavix and NTG prn- Rx given.  He also recommended increasing Norvasc to 5mg bid- Rx given.  She will follow up in clinic within 3 weeks. Of note, patient was very upset  to learn she was in observation status.  We had a long discussion and I will have the patient advocate contact her (they have left for the day) as I did reassure her we take her complaints seriously.  Return precautions given.     Consults:   Consults (From admission, onward)        Status Ordering Provider     Inpatient consult to Cardiology  Once     Provider:  Willie Barahona MD    Acknowledged MARGARET CAMARILLO new Assessment & Plan notes have been filed under this hospital service since the last note was generated.  Service: Hospital Medicine    Final Active Diagnoses:    Diagnosis Date Noted POA    Hypertension [I10] 10/24/2019 Yes      Problems Resolved During this Admission:    Diagnosis Date Noted Date Resolved POA    PRINCIPAL PROBLEM:  Chest pain [R07.9] 10/24/2019 10/24/2019 Yes       Discharged Condition: good    Disposition: Home or Self Care    Follow Up:  Follow-up Information     Primary Doctor No In 2 weeks.           Cardiology In 1 week.               Patient Instructions:      Diet Cardiac     Notify your health care provider if you experience any of the following:  temperature >100.4     Notify your health care provider if you experience any of the following:  persistent nausea and vomiting or diarrhea     Notify your health care provider if you experience any of the following:  severe uncontrolled pain     Notify your health care provider if you experience any of the following:  increased confusion or weakness     Activity as tolerated       Significant Diagnostic Studies: Labs:   CMP   Recent Labs   Lab 10/23/19  2327      K 3.7      CO2 24   GLU 96   BUN 27*   CREATININE 1.0   CALCIUM 8.8   PROT 7.2   ALBUMIN 4.2   BILITOT 0.8   ALKPHOS 70   AST 15   ALT 14   ANIONGAP 13   ESTGFRAFRICA >60.0   EGFRNONAA 52.6*    and CBC   Recent Labs   Lab 10/23/19  2327   WBC 9.88   HGB 14.1   HCT 42.7          Pending Diagnostic Studies:     Procedure Component Value  Units Date/Time    EKG 12-LEAD [[id number]]     Order Status:  Sent Lab Status:  No result     EKG 12-lead [[id number]]     Order Status:  Sent Lab Status:  No result     Echo Color Flow Doppler? Yes [[id number]]  (Abnormal) Resulted:  10/24/19 1411    Order Status:  Sent Lab Status:  In process Updated:  10/24/19 1415     BSA 1.98 m2      TDI SEPTAL 0.06 m/s      LV LATERAL E/E' RATIO 10.33 m/s      LV SEPTAL E/E' RATIO 15.50 m/s      AORTIC VALVE CUSP SEPERATION 0.96 cm      TDI LATERAL 0.09 m/s      PV PEAK VELOCITY 80.67 cm/s      LVIDD 3.30 cm      IVS 1.11 cm      PW 1.11 cm      Ao root annulus 3.12 cm      LVIDS 2.08 cm      FS 37 %      LV mass 110.64 g      LA size 2.52 cm      Left Ventricle Relative Wall Thickness 0.67 cm      AV mean gradient 5 mmHg      AV valve area 2.62 cm2      AV Velocity Ratio 85.40     AV index (prosthetic) 0.82     E/A ratio 0.85     Mean e' 0.08 m/s      E wave decelartion time 314.94 msec      LVOT diameter 2.02 cm      LVOT area 3.2 cm2      LVOT peak jose rafael 133.22 m/s      LVOT peak VTI 26.61 cm      Ao peak jose rafael 1.56 m/s      Ao VTI 32.58 cm      LVOT stroke volume 85.23 cm3      AV peak gradient 10 mmHg      E/E' ratio 12.40 m/s      MV Peak E Jose Rafael 0.93 m/s      TR Max Jose Rafael 2.13 m/s      MV Peak A Jose Rafael 1.10 m/s      LV Systolic Volume 32.60 mL      LV Systolic Volume Index 16.6 mL/m2      LV Diastolic Volume 92.50 mL      LV Diastolic Volume Index 47.22 mL/m2      LV Mass Index 56 g/m2      Triscuspid Valve Regurgitation Peak Gradient 18 mmHg     Narrative:       · Concentric left ventricular remodeling.       Impression:                Medications:  Reconciled Home Medications:      Medication List      START taking these medications    aspirin 81 MG Chew  Take 1 tablet (81 mg total) by mouth once daily.     clopidogrel 75 mg tablet  Commonly known as:  PLAVIX  Take 1 tablet (75 mg total) by mouth once daily.     nitroGLYCERIN 0.4 MG SL tablet  Commonly known as:  NITROSTAT  Place  1 tablet (0.4 mg total) under the tongue every 5 (five) minutes as needed for Chest pain.        CHANGE how you take these medications    amLODIPine 5 MG tablet  Commonly known as:  NORVASC  Take 1 tablet (5 mg total) by mouth 2 (two) times daily.  What changed:    · when to take this  · Another medication with the same name was removed. Continue taking this medication, and follow the directions you see here.            Indwelling Lines/Drains at time of discharge:   Lines/Drains/Airways     None                 Time spent on the discharge of patient: 31 minutes  Patient was seen and examined on the date of discharge and determined to be suitable for discharge.         Jean Trivedi MD  Department of Hospital Medicine  [facility]

## 2019-10-25 NOTE — NURSING
Pt discharged.     IV and monitor removed per day shift.     Discharge paperwork including new medications and electronically sent prescriptions reviewed with patient and family.     Yaneli Shirley

## 2019-10-25 NOTE — HOSPITAL COURSE
Patient admitted with chest pain.  Serial JAZ and stress test negative for ischemia. Patient with cabg in 2017.  Seen by Dr. Streeter who discussed adding plavix and NTG prn- Rx given.  He also recommended increasing Norvasc to 5mg bid- Rx given.  She will follow up in clinic within 3 weeks. Of note, patient was very upset to learn she was in observation status.  We had a long discussion and I will have the patient advocate contact her (they have left for the day) as I did reassure her we take her complaints seriously.  Return precautions given.

## 2020-01-14 ENCOUNTER — HOSPITAL ENCOUNTER (EMERGENCY)
Facility: HOSPITAL | Age: 85
Discharge: HOME | End: 2020-01-14
Attending: EMERGENCY MEDICINE
Payer: MEDICARE

## 2020-01-14 VITALS
SYSTOLIC BLOOD PRESSURE: 167 MMHG | RESPIRATION RATE: 20 BRPM | HEART RATE: 64 BPM | DIASTOLIC BLOOD PRESSURE: 74 MMHG | BODY MASS INDEX: 29.1 KG/M2 | HEIGHT: 68 IN | WEIGHT: 192 LBS | TEMPERATURE: 99 F | OXYGEN SATURATION: 94 %

## 2020-01-14 DIAGNOSIS — K57.92 ACUTE DIVERTICULITIS: Primary | ICD-10-CM

## 2020-01-14 LAB
ALBUMIN SERPL BCP-MCNC: 3.9 G/DL (ref 3.5–5.2)
ALP SERPL-CCNC: 48 U/L (ref 55–135)
ALT SERPL W/O P-5'-P-CCNC: 18 U/L (ref 10–44)
ANION GAP SERPL CALC-SCNC: 11 MMOL/L (ref 8–16)
AST SERPL-CCNC: 16 U/L (ref 10–40)
BACTERIA #/AREA URNS HPF: ABNORMAL /HPF
BASOPHILS # BLD AUTO: 0.06 K/UL (ref 0–0.2)
BASOPHILS NFR BLD: 0.6 % (ref 0–1.9)
BILIRUB SERPL-MCNC: 0.6 MG/DL (ref 0.1–1)
BILIRUB UR QL STRIP: NEGATIVE
BUN SERPL-MCNC: 16 MG/DL (ref 8–23)
CALCIUM SERPL-MCNC: 8.7 MG/DL (ref 8.7–10.5)
CHLORIDE SERPL-SCNC: 103 MMOL/L (ref 95–110)
CLARITY UR: CLEAR
CO2 SERPL-SCNC: 23 MMOL/L (ref 23–29)
COLOR UR: YELLOW
CREAT SERPL-MCNC: 0.8 MG/DL (ref 0.5–1.4)
DIFFERENTIAL METHOD: NORMAL
EOSINOPHIL # BLD AUTO: 0.3 K/UL (ref 0–0.5)
EOSINOPHIL NFR BLD: 2.6 % (ref 0–8)
ERYTHROCYTE [DISTWIDTH] IN BLOOD BY AUTOMATED COUNT: 13.9 % (ref 11.5–14.5)
EST. GFR  (AFRICAN AMERICAN): >60 ML/MIN/1.73 M^2
EST. GFR  (NON AFRICAN AMERICAN): >60 ML/MIN/1.73 M^2
GLUCOSE SERPL-MCNC: 103 MG/DL (ref 70–110)
GLUCOSE UR QL STRIP: NEGATIVE
HCT VFR BLD AUTO: 40.5 % (ref 37–48.5)
HGB BLD-MCNC: 13.1 G/DL (ref 12–16)
HGB UR QL STRIP: ABNORMAL
IMM GRANULOCYTES # BLD AUTO: 0.03 K/UL (ref 0–0.04)
IMM GRANULOCYTES NFR BLD AUTO: 0.3 % (ref 0–0.5)
KETONES UR QL STRIP: ABNORMAL
LEUKOCYTE ESTERASE UR QL STRIP: ABNORMAL
LIPASE SERPL-CCNC: 25 U/L (ref 4–60)
LYMPHOCYTES # BLD AUTO: 2.3 K/UL (ref 1–4.8)
LYMPHOCYTES NFR BLD: 24.2 % (ref 18–48)
MCH RBC QN AUTO: 29.7 PG (ref 27–31)
MCHC RBC AUTO-ENTMCNC: 32.3 G/DL (ref 32–36)
MCV RBC AUTO: 92 FL (ref 82–98)
MICROSCOPIC COMMENT: ABNORMAL
MONOCYTES # BLD AUTO: 0.8 K/UL (ref 0.3–1)
MONOCYTES NFR BLD: 7.9 % (ref 4–15)
NEUTROPHILS # BLD AUTO: 6.1 K/UL (ref 1.8–7.7)
NEUTROPHILS NFR BLD: 64.4 % (ref 38–73)
NITRITE UR QL STRIP: NEGATIVE
NRBC BLD-RTO: 0 /100 WBC
PH UR STRIP: 5 [PH] (ref 5–8)
PLATELET # BLD AUTO: 242 K/UL (ref 150–350)
PMV BLD AUTO: 10.8 FL (ref 9.2–12.9)
POTASSIUM SERPL-SCNC: 3.9 MMOL/L (ref 3.5–5.1)
PROT SERPL-MCNC: 7 G/DL (ref 6–8.4)
PROT UR QL STRIP: NEGATIVE
RBC # BLD AUTO: 4.41 M/UL (ref 4–5.4)
RBC #/AREA URNS HPF: 4 /HPF (ref 0–4)
SODIUM SERPL-SCNC: 137 MMOL/L (ref 136–145)
SP GR UR STRIP: 1.01 (ref 1–1.03)
URN SPEC COLLECT METH UR: ABNORMAL
UROBILINOGEN UR STRIP-ACNC: NEGATIVE EU/DL
WBC # BLD AUTO: 9.54 K/UL (ref 3.9–12.7)
WBC #/AREA URNS HPF: 11 /HPF (ref 0–5)

## 2020-01-14 PROCEDURE — 96368 THER/DIAG CONCURRENT INF: CPT

## 2020-01-14 PROCEDURE — 99285 EMERGENCY DEPT VISIT HI MDM: CPT | Mod: 25

## 2020-01-14 PROCEDURE — 81000 URINALYSIS NONAUTO W/SCOPE: CPT

## 2020-01-14 PROCEDURE — 96375 TX/PRO/DX INJ NEW DRUG ADDON: CPT

## 2020-01-14 PROCEDURE — 25000003 PHARM REV CODE 250: Performed by: FAMILY MEDICINE

## 2020-01-14 PROCEDURE — 63600175 PHARM REV CODE 636 W HCPCS: Performed by: FAMILY MEDICINE

## 2020-01-14 PROCEDURE — 74177 CT ABD & PELVIS W/CONTRAST: CPT | Mod: TC

## 2020-01-14 PROCEDURE — 85025 COMPLETE CBC W/AUTO DIFF WBC: CPT

## 2020-01-14 PROCEDURE — 74177 CT ABDOMEN PELVIS WITH CONTRAST: ICD-10-PCS | Mod: 26,,, | Performed by: RADIOLOGY

## 2020-01-14 PROCEDURE — 96361 HYDRATE IV INFUSION ADD-ON: CPT

## 2020-01-14 PROCEDURE — 80053 COMPREHEN METABOLIC PANEL: CPT

## 2020-01-14 PROCEDURE — 87086 URINE CULTURE/COLONY COUNT: CPT

## 2020-01-14 PROCEDURE — 74177 CT ABD & PELVIS W/CONTRAST: CPT | Mod: 26,,, | Performed by: RADIOLOGY

## 2020-01-14 PROCEDURE — S0030 INJECTION, METRONIDAZOLE: HCPCS | Performed by: FAMILY MEDICINE

## 2020-01-14 PROCEDURE — 83690 ASSAY OF LIPASE: CPT

## 2020-01-14 PROCEDURE — 25500020 PHARM REV CODE 255: Performed by: EMERGENCY MEDICINE

## 2020-01-14 PROCEDURE — 96365 THER/PROPH/DIAG IV INF INIT: CPT

## 2020-01-14 PROCEDURE — 63600175 PHARM REV CODE 636 W HCPCS: Performed by: EMERGENCY MEDICINE

## 2020-01-14 RX ORDER — CIPROFLOXACIN 500 MG/1
500 TABLET ORAL 2 TIMES DAILY
Qty: 14 TABLET | Refills: 0 | Status: SHIPPED | OUTPATIENT
Start: 2020-01-14 | End: 2020-01-21

## 2020-01-14 RX ORDER — METRONIDAZOLE 500 MG/1
500 TABLET ORAL 3 TIMES DAILY
Qty: 15 TABLET | Refills: 0 | Status: SHIPPED | OUTPATIENT
Start: 2020-01-14 | End: 2020-01-19

## 2020-01-14 RX ORDER — METRONIDAZOLE 500 MG/100ML
500 INJECTION, SOLUTION INTRAVENOUS
Status: COMPLETED | OUTPATIENT
Start: 2020-01-14 | End: 2020-01-14

## 2020-01-14 RX ORDER — ONDANSETRON 2 MG/ML
4 INJECTION INTRAMUSCULAR; INTRAVENOUS
Status: COMPLETED | OUTPATIENT
Start: 2020-01-14 | End: 2020-01-14

## 2020-01-14 RX ORDER — MORPHINE SULFATE 4 MG/ML
4 INJECTION, SOLUTION INTRAMUSCULAR; INTRAVENOUS
Status: COMPLETED | OUTPATIENT
Start: 2020-01-14 | End: 2020-01-14

## 2020-01-14 RX ADMIN — MORPHINE SULFATE 4 MG: 4 INJECTION INTRAVENOUS at 05:01

## 2020-01-14 RX ADMIN — SODIUM CHLORIDE 1000 ML: 0.9 INJECTION, SOLUTION INTRAVENOUS at 05:01

## 2020-01-14 RX ADMIN — ONDANSETRON 4 MG: 2 INJECTION INTRAMUSCULAR; INTRAVENOUS at 05:01

## 2020-01-14 RX ADMIN — METRONIDAZOLE 500 MG: 500 INJECTION, SOLUTION INTRAVENOUS at 08:01

## 2020-01-14 RX ADMIN — IOHEXOL 75 ML: 350 INJECTION, SOLUTION INTRAVENOUS at 06:01

## 2020-01-14 RX ADMIN — CEFTRIAXONE 1 G: 1 INJECTION, SOLUTION INTRAVENOUS at 08:01

## 2020-01-14 NOTE — ED PROVIDER NOTES
Encounter Date: 1/14/2020       History     Chief Complaint   Patient presents with    Abdominal Pain     Patient complaining of abdominal pain since Sunday, also has had diarrhea.     The patient is an 84-year-old female who has a history of chronic hearing loss who presents with suprapubic abdominal pain that began two days ago.  She describes the pain as a burning and bloating sensation.  She has no urinary symptoms such as dysuria, frequency, or hematuria.  She has associated tenderness in this area.  She denies fever and chills. She has nausea but has not vomited.  She had diarrhea for three days last week that resolved without treatment.  She denies diarrhea at present.  She reports no darkened or bloody stools.  She denies headache. She felt lightheaded with ambulation earlier.  She denies chest pain, palpitations, cough, and shortness of breath. She has not taking any medications to attempt treatment.    The history is provided by the patient. No  was used.     Review of patient's allergies indicates:   Allergen Reactions    [place] thyroid  [thyroid (pork)]      Other reaction(s): palpitations     Past Medical History:   Diagnosis Date    Hearing loss, left     White coat hypertension      Past Surgical History:   Procedure Laterality Date    BUNIONECTOMY      EYE SURGERY      cataract    HEMORRHOID SURGERY      SHOULDER SURGERY       Family History   Problem Relation Age of Onset    Heart disease Mother     Heart disease Father      Social History     Tobacco Use    Smoking status: Never Smoker   Substance Use Topics    Alcohol use: No     Comment: occasionally    Drug use: No     Review of Systems   Constitutional: Positive for fatigue. Negative for chills and fever.   HENT: Negative for sore throat and trouble swallowing.    Eyes: Negative for visual disturbance.   Respiratory: Negative for cough and shortness of breath.    Cardiovascular: Negative for chest pain,  palpitations and leg swelling.   Gastrointestinal: Positive for abdominal pain and nausea. Negative for blood in stool, constipation, diarrhea and vomiting.   Endocrine: Negative for polydipsia and polyuria.   Genitourinary: Negative for dysuria, frequency and hematuria.   Musculoskeletal: Negative for back pain.   Skin: Negative for rash.   Neurological: Negative for dizziness, syncope, light-headedness and headaches.       Physical Exam     Initial Vitals [01/14/20 1646]   BP Pulse Resp Temp SpO2   (!) 193/104 90 20 98.6 °F (37 °C) 97 %      MAP       --         Physical Exam    Nursing note and vitals reviewed.  Constitutional: She appears well-developed and well-nourished. She is not diaphoretic. No distress.   HENT:   Head: Normocephalic and atraumatic.   Mouth/Throat: Oropharynx is clear and moist.   Eyes: Conjunctivae are normal. No scleral icterus.   Neck: No JVD present.   Cardiovascular: Normal rate, regular rhythm and normal heart sounds. Exam reveals no gallop and no friction rub.    No murmur heard.  Pulmonary/Chest: Effort normal and breath sounds normal. No stridor. No respiratory distress. She has no decreased breath sounds. She has no wheezes. She has no rhonchi. She has no rales.   Abdominal: Soft. She exhibits no distension. There is tenderness in the suprapubic area and left lower quadrant. There is guarding. There is no CVA tenderness.   Musculoskeletal:   No step-offs or other visible/palpable deformities throughout the thoracic and lumbar spine.  No spinous process, paravertebral, or other areas of tenderness. No overlying rashes or lesions. No pain with normal movements of the back.   Neurological: She is alert and oriented to person, place, and time. GCS score is 15. GCS eye subscore is 4. GCS verbal subscore is 5. GCS motor subscore is 6.   Skin: Skin is warm and dry. No pallor.         ED Course   Procedures  Labs Reviewed - No data to display       Imaging Results    None                                           Clinical Impression:       ICD-10-CM ICD-9-CM   1. Acute diverticulitis K57.92 562.11                             Raman Chua MD  01/14/20 2026

## 2020-01-15 LAB — BACTERIA UR CULT: NORMAL

## 2020-01-15 NOTE — ED NOTES
Patient updated in plan of care, she verbalizes understanding. No immediate needs voiced at this time.

## 2020-01-18 ENCOUNTER — NURSE TRIAGE (OUTPATIENT)
Dept: ADMINISTRATIVE | Facility: CLINIC | Age: 85
End: 2020-01-18

## 2020-01-18 NOTE — TELEPHONE ENCOUNTER
Tuesday night went to ED in [facility]. Was diagnosed with diverticulitis and given cipro, flagyl. Has developed diarrhea.  Diarrhea started 2 days ago, taking pepto bismol  Still having many loose stools (has had about 8 already today)  Drinking fluids, urinating regularly.  Reason for Disposition   [1] SEVERE diarrhea (e.g., 7 or more times / day more than normal) AND [2]  age > 60 years    Additional Information   Negative: Shock suspected (e.g., cold/pale/clammy skin, too weak to stand, low BP, rapid pulse)   Negative: Difficult to awaken or acting confused (e.g., disoriented, slurred speech)   Negative: Sounds like a life-threatening emergency to the triager   Negative: Vomiting also present and worse than the diarrhea   Negative: [1] Blood in stool AND [2] without diarrhea   Negative: Diarrhea in a cancer patient who is currently (or recently) receiving chemotherapy or radiation therapy, or cancer patient who has metastatic or end-stage cancer and is receiving palliative care   Negative: [1] SEVERE abdominal pain (e.g., excruciating) AND [2] present > 1 hour   Negative: [1] SEVERE abdominal pain AND [2] age > 60   Negative: [1] Blood in the stool AND [2] moderate or large amount of blood   Negative: Black or tarry bowel movements  (Exception: chronic-unchanged  black-grey bowel movements AND is taking iron pills or Pepto-bismol)   Negative: [1] Drinking very little AND [2] dehydration suspected (e.g., no urine > 12 hours, very dry mouth, very lightheaded)   Negative: Patient sounds very sick or weak to the triager    Protocols used: DIARRHEA-A-AH

## 2020-01-23 ENCOUNTER — LAB VISIT (OUTPATIENT)
Dept: LAB | Facility: HOSPITAL | Age: 85
End: 2020-01-23
Attending: FAMILY MEDICINE
Payer: MEDICARE

## 2020-01-23 DIAGNOSIS — K57.92 DIVERTICULITIS: Primary | ICD-10-CM

## 2020-01-23 PROCEDURE — 87046 STOOL CULTR AEROBIC BACT EA: CPT

## 2020-01-23 PROCEDURE — 87209 SMEAR COMPLEX STAIN: CPT

## 2020-01-23 PROCEDURE — 87427 SHIGA-LIKE TOXIN AG IA: CPT

## 2020-01-23 PROCEDURE — 87045 FECES CULTURE AEROBIC BACT: CPT

## 2020-01-24 LAB
O+P STL TRI STN: NORMAL

## 2020-01-27 LAB — BACTERIA STL CULT: NORMAL

## 2020-02-17 ENCOUNTER — LAB VISIT (OUTPATIENT)
Dept: LAB | Facility: HOSPITAL | Age: 85
End: 2020-02-17
Attending: PSYCHIATRY & NEUROLOGY
Payer: MEDICARE

## 2020-02-17 DIAGNOSIS — I10 ESSENTIAL HYPERTENSION, MALIGNANT: Primary | ICD-10-CM

## 2020-02-17 LAB
ANION GAP SERPL CALC-SCNC: 10 MMOL/L (ref 8–16)
BUN SERPL-MCNC: 20 MG/DL (ref 8–23)
CALCIUM SERPL-MCNC: 8.8 MG/DL (ref 8.7–10.5)
CHLORIDE SERPL-SCNC: 104 MMOL/L (ref 95–110)
CO2 SERPL-SCNC: 26 MMOL/L (ref 23–29)
CREAT SERPL-MCNC: 0.8 MG/DL (ref 0.5–1.4)
ERYTHROCYTE [DISTWIDTH] IN BLOOD BY AUTOMATED COUNT: 14.5 % (ref 11.5–14.5)
EST. GFR  (AFRICAN AMERICAN): >60 ML/MIN/1.73 M^2
EST. GFR  (NON AFRICAN AMERICAN): >60 ML/MIN/1.73 M^2
GLUCOSE SERPL-MCNC: 114 MG/DL (ref 70–110)
HCT VFR BLD AUTO: 40 % (ref 37–48.5)
HGB BLD-MCNC: 12.8 G/DL (ref 12–16)
MCH RBC QN AUTO: 29.4 PG (ref 27–31)
MCHC RBC AUTO-ENTMCNC: 32 G/DL (ref 32–36)
MCV RBC AUTO: 92 FL (ref 82–98)
PLATELET # BLD AUTO: 236 K/UL (ref 150–350)
PMV BLD AUTO: 10.2 FL (ref 9.2–12.9)
POTASSIUM SERPL-SCNC: 4.1 MMOL/L (ref 3.5–5.1)
RBC # BLD AUTO: 4.36 M/UL (ref 4–5.4)
SODIUM SERPL-SCNC: 140 MMOL/L (ref 136–145)
WBC # BLD AUTO: 7.25 K/UL (ref 3.9–12.7)

## 2020-02-17 PROCEDURE — 36415 COLL VENOUS BLD VENIPUNCTURE: CPT

## 2020-02-17 PROCEDURE — 80048 BASIC METABOLIC PNL TOTAL CA: CPT

## 2020-02-17 PROCEDURE — 85027 COMPLETE CBC AUTOMATED: CPT

## 2020-02-21 ENCOUNTER — HOSPITAL ENCOUNTER (OUTPATIENT)
Dept: CARDIOLOGY | Facility: HOSPITAL | Age: 85
Discharge: HOME | End: 2020-02-21
Attending: PSYCHIATRY & NEUROLOGY
Payer: MEDICARE

## 2020-02-21 DIAGNOSIS — I10 HYPERTENSION: ICD-10-CM

## 2020-02-21 DIAGNOSIS — I10 HYPERTENSION: Primary | ICD-10-CM

## 2020-02-21 PROCEDURE — 93005 ELECTROCARDIOGRAM TRACING: CPT

## 2020-06-14 ENCOUNTER — HOSPITAL ENCOUNTER (OUTPATIENT)
Facility: HOSPITAL | Age: 85
Discharge: HOME | End: 2020-06-15
Attending: EMERGENCY MEDICINE | Admitting: INTERNAL MEDICINE
Payer: MEDICARE

## 2020-06-14 DIAGNOSIS — R07.9 CHEST PAIN, UNSPECIFIED TYPE: Primary | ICD-10-CM

## 2020-06-14 DIAGNOSIS — I10 BP (HIGH BLOOD PRESSURE): ICD-10-CM

## 2020-06-14 DIAGNOSIS — R07.9 CHEST PAIN: ICD-10-CM

## 2020-06-14 DIAGNOSIS — I25.10 ASCVD (ARTERIOSCLEROTIC CARDIOVASCULAR DISEASE): ICD-10-CM

## 2020-06-14 PROBLEM — I16.1 HYPERTENSIVE EMERGENCY: Status: ACTIVE | Noted: 2020-06-14

## 2020-06-14 PROBLEM — I16.0 HYPERTENSIVE URGENCY: Status: ACTIVE | Noted: 2020-06-14

## 2020-06-14 LAB
ALBUMIN SERPL BCP-MCNC: 4.2 G/DL (ref 3.5–5.2)
ALP SERPL-CCNC: 52 U/L (ref 55–135)
ALT SERPL W/O P-5'-P-CCNC: 17 U/L (ref 10–44)
ANION GAP SERPL CALC-SCNC: 12 MMOL/L (ref 8–16)
AST SERPL-CCNC: 17 U/L (ref 10–40)
BASOPHILS # BLD AUTO: 0.06 K/UL (ref 0–0.2)
BASOPHILS NFR BLD: 0.8 % (ref 0–1.9)
BILIRUB SERPL-MCNC: 0.6 MG/DL (ref 0.1–1)
BILIRUB UR QL STRIP: NEGATIVE
BUN SERPL-MCNC: 15 MG/DL (ref 8–23)
CALCIUM SERPL-MCNC: 9.6 MG/DL (ref 8.7–10.5)
CHLORIDE SERPL-SCNC: 105 MMOL/L (ref 95–110)
CLARITY UR: CLEAR
CO2 SERPL-SCNC: 24 MMOL/L (ref 23–29)
COLOR UR: YELLOW
CREAT SERPL-MCNC: 0.8 MG/DL (ref 0.5–1.4)
DIFFERENTIAL METHOD: NORMAL
EOSINOPHIL # BLD AUTO: 0.3 K/UL (ref 0–0.5)
EOSINOPHIL NFR BLD: 3.4 % (ref 0–8)
ERYTHROCYTE [DISTWIDTH] IN BLOOD BY AUTOMATED COUNT: 13.9 % (ref 11.5–14.5)
EST. GFR  (AFRICAN AMERICAN): >60 ML/MIN/1.73 M^2
EST. GFR  (NON AFRICAN AMERICAN): >60 ML/MIN/1.73 M^2
GLUCOSE SERPL-MCNC: 108 MG/DL (ref 70–110)
GLUCOSE UR QL STRIP: NEGATIVE
HCT VFR BLD AUTO: 44 % (ref 37–48.5)
HGB BLD-MCNC: 14.5 G/DL (ref 12–16)
HGB UR QL STRIP: NEGATIVE
IMM GRANULOCYTES # BLD AUTO: 0.03 K/UL (ref 0–0.04)
IMM GRANULOCYTES NFR BLD AUTO: 0.4 % (ref 0–0.5)
KETONES UR QL STRIP: ABNORMAL
LEUKOCYTE ESTERASE UR QL STRIP: NEGATIVE
LYMPHOCYTES # BLD AUTO: 2.1 K/UL (ref 1–4.8)
LYMPHOCYTES NFR BLD: 26.1 % (ref 18–48)
MAGNESIUM SERPL-MCNC: 2 MG/DL (ref 1.6–2.6)
MCH RBC QN AUTO: 29.4 PG (ref 27–31)
MCHC RBC AUTO-ENTMCNC: 33 G/DL (ref 32–36)
MCV RBC AUTO: 89 FL (ref 82–98)
MONOCYTES # BLD AUTO: 0.6 K/UL (ref 0.3–1)
MONOCYTES NFR BLD: 8 % (ref 4–15)
NEUTROPHILS # BLD AUTO: 4.9 K/UL (ref 1.8–7.7)
NEUTROPHILS NFR BLD: 61.3 % (ref 38–73)
NITRITE UR QL STRIP: NEGATIVE
NRBC BLD-RTO: 0 /100 WBC
PH UR STRIP: 7 [PH] (ref 5–8)
PLATELET # BLD AUTO: 248 K/UL (ref 150–350)
PMV BLD AUTO: 10.3 FL (ref 9.2–12.9)
POTASSIUM SERPL-SCNC: 4 MMOL/L (ref 3.5–5.1)
PROT SERPL-MCNC: 7.7 G/DL (ref 6–8.4)
PROT UR QL STRIP: NEGATIVE
RBC # BLD AUTO: 4.94 M/UL (ref 4–5.4)
SARS-COV-2 RDRP RESP QL NAA+PROBE: NEGATIVE
SODIUM SERPL-SCNC: 141 MMOL/L (ref 136–145)
SP GR UR STRIP: 1.01 (ref 1–1.03)
TROPONIN I SERPL DL<=0.01 NG/ML-MCNC: <0.03 NG/ML
URN SPEC COLLECT METH UR: ABNORMAL
UROBILINOGEN UR STRIP-ACNC: NEGATIVE EU/DL
WBC # BLD AUTO: 8 K/UL (ref 3.9–12.7)

## 2020-06-14 PROCEDURE — 63600175 PHARM REV CODE 636 W HCPCS: Performed by: EMERGENCY MEDICINE

## 2020-06-14 PROCEDURE — 63600175 PHARM REV CODE 636 W HCPCS: Performed by: INTERNAL MEDICINE

## 2020-06-14 PROCEDURE — 36415 COLL VENOUS BLD VENIPUNCTURE: CPT

## 2020-06-14 PROCEDURE — G0378 HOSPITAL OBSERVATION PER HR: HCPCS

## 2020-06-14 PROCEDURE — 83735 ASSAY OF MAGNESIUM: CPT

## 2020-06-14 PROCEDURE — 96374 THER/PROPH/DIAG INJ IV PUSH: CPT

## 2020-06-14 PROCEDURE — 96372 THER/PROPH/DIAG INJ SC/IM: CPT | Mod: 59

## 2020-06-14 PROCEDURE — 96375 TX/PRO/DX INJ NEW DRUG ADDON: CPT

## 2020-06-14 PROCEDURE — U0002 COVID-19 LAB TEST NON-CDC: HCPCS

## 2020-06-14 PROCEDURE — 96376 TX/PRO/DX INJ SAME DRUG ADON: CPT

## 2020-06-14 PROCEDURE — 96361 HYDRATE IV INFUSION ADD-ON: CPT | Mod: GZ

## 2020-06-14 PROCEDURE — 85025 COMPLETE CBC W/AUTO DIFF WBC: CPT

## 2020-06-14 PROCEDURE — 84484 ASSAY OF TROPONIN QUANT: CPT

## 2020-06-14 PROCEDURE — 80053 COMPREHEN METABOLIC PANEL: CPT

## 2020-06-14 PROCEDURE — 81003 URINALYSIS AUTO W/O SCOPE: CPT

## 2020-06-14 PROCEDURE — 96361 HYDRATE IV INFUSION ADD-ON: CPT | Mod: 59

## 2020-06-14 PROCEDURE — 99285 EMERGENCY DEPT VISIT HI MDM: CPT | Mod: 25

## 2020-06-14 PROCEDURE — 93005 ELECTROCARDIOGRAM TRACING: CPT | Performed by: INTERNAL MEDICINE

## 2020-06-14 PROCEDURE — 25000003 PHARM REV CODE 250: Performed by: EMERGENCY MEDICINE

## 2020-06-14 PROCEDURE — 25000003 PHARM REV CODE 250: Performed by: INTERNAL MEDICINE

## 2020-06-14 RX ORDER — NITROGLYCERIN 0.4 MG/1
0.4 TABLET SUBLINGUAL EVERY 5 MIN PRN
Status: DISCONTINUED | OUTPATIENT
Start: 2020-06-14 | End: 2020-06-15 | Stop reason: HOSPADM

## 2020-06-14 RX ORDER — SODIUM CHLORIDE 0.9 % (FLUSH) 0.9 %
10 SYRINGE (ML) INJECTION
Status: DISCONTINUED | OUTPATIENT
Start: 2020-06-14 | End: 2020-06-15 | Stop reason: HOSPADM

## 2020-06-14 RX ORDER — NAPROXEN SODIUM 220 MG/1
81 TABLET, FILM COATED ORAL DAILY
Status: DISCONTINUED | OUTPATIENT
Start: 2020-06-15 | End: 2020-06-15 | Stop reason: HOSPADM

## 2020-06-14 RX ORDER — SODIUM CHLORIDE 9 MG/ML
1000 INJECTION, SOLUTION INTRAVENOUS
Status: COMPLETED | OUTPATIENT
Start: 2020-06-14 | End: 2020-06-14

## 2020-06-14 RX ORDER — ACETAMINOPHEN 325 MG/1
650 TABLET ORAL EVERY 4 HOURS PRN
Status: DISCONTINUED | OUTPATIENT
Start: 2020-06-14 | End: 2020-06-15 | Stop reason: HOSPADM

## 2020-06-14 RX ORDER — ONDANSETRON 2 MG/ML
4 INJECTION INTRAMUSCULAR; INTRAVENOUS
Status: COMPLETED | OUTPATIENT
Start: 2020-06-14 | End: 2020-06-14

## 2020-06-14 RX ORDER — HYDRALAZINE HYDROCHLORIDE 20 MG/ML
10 INJECTION INTRAMUSCULAR; INTRAVENOUS
Status: COMPLETED | OUTPATIENT
Start: 2020-06-14 | End: 2020-06-14

## 2020-06-14 RX ORDER — ATORVASTATIN CALCIUM 40 MG/1
40 TABLET, FILM COATED ORAL DAILY
Status: DISCONTINUED | OUTPATIENT
Start: 2020-06-15 | End: 2020-06-15 | Stop reason: HOSPADM

## 2020-06-14 RX ORDER — NITROFURANTOIN 25; 75 MG/1; MG/1
100 CAPSULE ORAL 2 TIMES DAILY
COMMUNITY
Start: 2020-06-11 | End: 2020-11-23

## 2020-06-14 RX ORDER — ASPIRIN 325 MG
325 TABLET ORAL
Status: COMPLETED | OUTPATIENT
Start: 2020-06-14 | End: 2020-06-14

## 2020-06-14 RX ORDER — ENOXAPARIN SODIUM 100 MG/ML
40 INJECTION SUBCUTANEOUS EVERY 24 HOURS
Status: DISCONTINUED | OUTPATIENT
Start: 2020-06-14 | End: 2020-06-15 | Stop reason: HOSPADM

## 2020-06-14 RX ORDER — LISINOPRIL 5 MG/1
5 TABLET ORAL DAILY
Status: DISCONTINUED | OUTPATIENT
Start: 2020-06-14 | End: 2020-06-15

## 2020-06-14 RX ORDER — HYDRALAZINE HYDROCHLORIDE 20 MG/ML
10 INJECTION INTRAMUSCULAR; INTRAVENOUS EVERY 8 HOURS PRN
Status: DISCONTINUED | OUTPATIENT
Start: 2020-06-14 | End: 2020-06-15 | Stop reason: HOSPADM

## 2020-06-14 RX ORDER — ONDANSETRON 2 MG/ML
4 INJECTION INTRAMUSCULAR; INTRAVENOUS EVERY 8 HOURS PRN
Status: DISCONTINUED | OUTPATIENT
Start: 2020-06-14 | End: 2020-06-15 | Stop reason: HOSPADM

## 2020-06-14 RX ORDER — AMLODIPINE BESYLATE 5 MG/1
5 TABLET ORAL 2 TIMES DAILY
Status: DISCONTINUED | OUTPATIENT
Start: 2020-06-14 | End: 2020-06-15 | Stop reason: HOSPADM

## 2020-06-14 RX ORDER — NITROFURANTOIN 25; 75 MG/1; MG/1
100 CAPSULE ORAL 2 TIMES DAILY
Status: DISCONTINUED | OUTPATIENT
Start: 2020-06-14 | End: 2020-06-15 | Stop reason: HOSPADM

## 2020-06-14 RX ADMIN — HYDRALAZINE HYDROCHLORIDE 10 MG: 20 INJECTION INTRAMUSCULAR; INTRAVENOUS at 01:06

## 2020-06-14 RX ADMIN — ONDANSETRON 4 MG: 2 INJECTION INTRAMUSCULAR; INTRAVENOUS at 05:06

## 2020-06-14 RX ADMIN — AMLODIPINE BESYLATE 5 MG: 5 TABLET ORAL at 09:06

## 2020-06-14 RX ADMIN — HYDRALAZINE HYDROCHLORIDE 10 MG: 20 INJECTION INTRAMUSCULAR; INTRAVENOUS at 02:06

## 2020-06-14 RX ADMIN — ASPIRIN 325 MG ORAL TABLET 325 MG: 325 PILL ORAL at 05:06

## 2020-06-14 RX ADMIN — ENOXAPARIN SODIUM 40 MG: 100 INJECTION SUBCUTANEOUS at 09:06

## 2020-06-14 RX ADMIN — SODIUM CHLORIDE 1000 ML: 0.9 INJECTION, SOLUTION INTRAVENOUS at 05:06

## 2020-06-14 NOTE — ED PROVIDER NOTES
Encounter Date: 6/14/2020       History     Chief Complaint   Patient presents with    Hypertension     X FEW DAYS, SEEN AT URGENT CARE AND TOLD SHE ALSO HAS AN UTI, FEELING BAD     84-year-old female who has a history of significant hearing loss and hypertension, was seen in urgent care 4 days ago and diagnosed with UTI for which she was placed on Macrobid.  The patient was told at that time she had an elevated blood pressure.  She states that she checked her blood pressure again this morning and noticed that was persistently high despite taking Norvasc 5 mg b.i.d. and for that reason decided to come to the emergency room.  She denies any complaint of any headache but states that she just does not feel well.  No nausea vomiting.  No complaint of any CVA pain.  No thoracic back pain coughing or shortness of breath.  No anterior chest pain.  No hematuria or incontinence.  No focal weakness or sensory changes.        Review of patient's allergies indicates:   Allergen Reactions    [place] thyroid  [thyroid (pork)]      Other reaction(s): palpitations     Past Medical History:   Diagnosis Date    Hearing loss, left     White coat hypertension      Past Surgical History:   Procedure Laterality Date    BUNIONECTOMY      EYE SURGERY      cataract    HEMORRHOID SURGERY      SHOULDER SURGERY       Family History   Problem Relation Age of Onset    Heart disease Mother     Heart disease Father      Social History     Tobacco Use    Smoking status: Never Smoker   Substance Use Topics    Alcohol use: No     Comment: occasionally    Drug use: No     Review of Systems   Constitutional: Negative for activity change, appetite change, chills, diaphoresis and fever.   HENT: Negative for congestion, ear pain, facial swelling, nosebleeds, rhinorrhea, sinus pressure, sinus pain, sore throat and trouble swallowing.    Eyes: Negative for visual disturbance.   Respiratory: Negative for cough, chest tightness, shortness of  breath and wheezing.    Cardiovascular: Negative for chest pain, palpitations and leg swelling.   Gastrointestinal: Negative for abdominal pain, constipation, diarrhea, nausea and vomiting.   Genitourinary: Positive for frequency. Negative for difficulty urinating and dysuria.   Musculoskeletal: Positive for back pain. Negative for neck pain.   Skin: Negative for rash.   Neurological: Negative for dizziness, seizures, syncope, weakness, light-headedness and headaches.   Hematological: Does not bruise/bleed easily.   All other systems reviewed and are negative.      Physical Exam     Initial Vitals [06/14/20 1320]   BP Pulse Resp Temp SpO2   (!) 232/103 79 18 98.2 °F (36.8 °C) 96 %      MAP       --         Physical Exam    Constitutional: She appears well-developed and well-nourished. She is not diaphoretic. No distress.   HENT:   Head: Normocephalic and atraumatic.   Nose: Nose normal.   Mouth/Throat: Oropharynx is clear and moist. No oropharyngeal exudate.   Eyes: Conjunctivae are normal. Pupils are equal, round, and reactive to light. Right eye exhibits no discharge. Left eye exhibits no discharge.   Neck: Normal range of motion. Neck supple. No JVD present.   Cardiovascular: Normal rate, regular rhythm, normal heart sounds and intact distal pulses. Exam reveals no gallop and no friction rub.    No murmur heard.  Pulmonary/Chest: Breath sounds normal. No respiratory distress. She has no wheezes. She has no rhonchi. She has no rales.   Abdominal: Soft. Bowel sounds are normal. She exhibits no distension. There is no abdominal tenderness. There is no rebound and no guarding.   No CVA tenderness   Musculoskeletal: Normal range of motion. No tenderness or edema.   Lymphadenopathy:     She has no cervical adenopathy.   Neurological: She is alert and oriented to person, place, and time. She has normal strength. GCS score is 15. GCS eye subscore is 4. GCS verbal subscore is 5. GCS motor subscore is 6.   Skin: Skin is  warm and dry. Capillary refill takes less than 2 seconds. No rash noted. No erythema. No pallor.   Psychiatric: She has a normal mood and affect. Her behavior is normal. Judgment and thought content normal.         ED Course   Procedures  Labs Reviewed - No data to display       Imaging Results    None          Medical Decision Making:   ED Management:  I received turnover from Dr. Ni awaiting troponin.  Patient screening labs, urinalysis, troponin are normal.  EKG is without ST changes, chest x-ray is clear.  Repeat assessment patient states she still feels very bad patient states she continues to have chest heaviness.  She has mild headache.  Mild nausea.  No vomiting.  Patient has history of heart disease.  She will require admission to the hospital for further cardiac evaluation.  Blood pressures have improved after IV hydralazine.  She will receive aspirin.  She remains clinically stable.              Attending Attestation:             Attending ED Notes:   This 84-year-old female who presented with a history of being currently treated for urinary tract infection but also cool having markedly elevated blood pressure despite taking her Norvasc 5 mg twice daily without failure.  The patient has remained neurologically intact.  Labs including a cath urinalysis CBC and chemistries are unremarkable.  EKG shows a sinus rhythm.  During the ED course the patient did receive a total of 20 mg of hydralazine thus far and will be further monitored to determine whether not she needs any further adjustment in medication.    After dose of 10 mg hydralazine the patient's blood pressure was 160/70.  Patient though continues stating that she disc did not feel well in when questioned as to specifically what may be causing her discomfort only at that time did she complain of having some anterior chest discomfort.  As a consequence a chest x-ray and troponin as well as a serum magnesium are ordered.  As mentioned  previously her EKG is normal.  The bedside monitor however did show some unifocal PVCs.  At this time the patient's care is being transition to Dr. Llamas pending remainder of the workup.                        Clinical Impression:       ICD-10-CM ICD-9-CM   1. Chest pain, unspecified type  R07.9 786.50   2. BP (high blood pressure)  I10 401.9                                Buddy Llamas MD  06/14/20 1877

## 2020-06-14 NOTE — HPI
Patient is a 84-year-old  female with strong family history of CAD, personal history of CAD status post CABG in 2017, essential hypertension and chronic hearing loss who presented to the ED with chief complaint of elevated blood pressure.  Had a transient episode of chest tightness while in the ED which has since resolved.  She is currently on amlodipine 5 mg twice daily at home and reports taking it religiously.  She was recently placed on nitrofurantoin for UTI.      She currently endorses generalized headache which is dull with no radiation.  No exacerbating or relieving factors.  She denies chest pain or chest pressure.  She also denies shortness of breath, fever, chills, abdominal pain or diarrhea/constipation.  She is anxious about getting admitted under observation status.  She is also worried that her hearing aid battery might die soon.    Follows with Dr. Barahona from cardiology.  She was admitted with chest pain in October 2019 and underwent stress test which was negative for ischemia.    In the ED:  Initial vital signs with blood pressure of 232/103 mmHg which gradually improved to 160s/70s after 10 mg of IV hydralazine x2.  She received full-dose aspirin in the ED.  Initial troponin within normal limits.  EKG without any acute ST T wave changes.    Rest of the 10 point review of systems is negative except as mentioned above.

## 2020-06-14 NOTE — SUBJECTIVE & OBJECTIVE
Past Medical History:   Diagnosis Date    Hearing loss, left     White coat hypertension        Past Surgical History:   Procedure Laterality Date    BUNIONECTOMY      EYE SURGERY      cataract    HEMORRHOID SURGERY      SHOULDER SURGERY         Review of patient's allergies indicates:   Allergen Reactions    [place] thyroid  [thyroid (pork)]      Other reaction(s): palpitations       No current facility-administered medications on file prior to encounter.      Current Outpatient Medications on File Prior to Encounter   Medication Sig    amLODIPine (NORVASC) 5 MG tablet Take 1 tablet (5 mg total) by mouth 2 (two) times daily.    aspirin 81 MG Chew Take 1 tablet (81 mg total) by mouth once daily.    nitrofurantoin, macrocrystal-monohydrate, (MACROBID) 100 MG capsule Take 100 mg by mouth 2 (two) times daily. For 5 days    nitroGLYCERIN (NITROSTAT) 0.4 MG SL tablet Place 1 tablet (0.4 mg total) under the tongue every 5 (five) minutes as needed for Chest pain.     Family History     Problem Relation (Age of Onset)    Heart disease Mother, Father        Tobacco Use    Smoking status: Never Smoker   Substance and Sexual Activity    Alcohol use: No     Comment: occasionally    Drug use: No    Sexual activity: Yes     Birth control/protection: Post-menopausal       Objective:     Vital Signs (Most Recent):  Temp: 98.2 °F (36.8 °C) (06/14/20 1320)  Pulse: 86 (06/14/20 1718)  Resp: 12 (06/14/20 1718)  BP: (!) 162/72 (06/14/20 1715)  SpO2: 97 % (06/14/20 1718) Vital Signs (24h Range):  Temp:  [98.2 °F (36.8 °C)] 98.2 °F (36.8 °C)  Pulse:  [69-99] 86  Resp:  [10-26] 12  SpO2:  [96 %-99 %] 97 %  BP: (152-232)/() 162/72     Weight: 86.2 kg (190 lb)  Body mass index is 28.47 kg/m².    Physical Exam  Vitals signs and nursing note reviewed.   Constitutional:       General: She is not in acute distress.     Appearance: She is well-developed.   HENT:      Head: Normocephalic and atraumatic.      Ears:       Comments: Hearing loss noted  Eyes:      General: No scleral icterus.     Conjunctiva/sclera: Conjunctivae normal.      Pupils: Pupils are equal, round, and reactive to light.   Neck:      Musculoskeletal: Neck supple.      Thyroid: No thyromegaly.   Cardiovascular:      Rate and Rhythm: Normal rate and regular rhythm.      Heart sounds: Normal heart sounds. No murmur.   Pulmonary:      Effort: Pulmonary effort is normal. No respiratory distress.      Breath sounds: Normal breath sounds.   Abdominal:      General: Bowel sounds are normal.      Palpations: Abdomen is soft.      Tenderness: There is no abdominal tenderness.   Musculoskeletal:         General: No deformity.   Skin:     General: Skin is warm and dry.      Capillary Refill: Capillary refill takes less than 2 seconds.      Findings: No erythema.   Neurological:      General: No focal deficit present.      Mental Status: She is alert.   Psychiatric:         Behavior: Behavior normal.      Comments: Anxious and worried           CRANIAL NERVES     CN III, IV, VI   Pupils are equal, round, and reactive to light.       Significant Labs:   CBC:   Recent Labs   Lab 06/14/20  1336   WBC 8.00   HGB 14.5   HCT 44.0        CMP:   Recent Labs   Lab 06/14/20  1336      K 4.0      CO2 24      BUN 15   CREATININE 0.8   CALCIUM 9.6   PROT 7.7   ALBUMIN 4.2   BILITOT 0.6   ALKPHOS 52*   AST 17   ALT 17   ANIONGAP 12   EGFRNONAA >60.0     Troponin:   Recent Labs   Lab 06/14/20  1336   TROPONINI <0.030       Significant Imaging: I have reviewed all pertinent imaging results/findings within the past 24 hours.     Results for orders placed or performed during the hospital encounter of 06/14/20   EKG 12-lead    Collection Time: 06/14/20  1:26 PM    Narrative    Test Reason : I10,    Vent. Rate : 076 BPM     Atrial Rate : 076 BPM     P-R Int : 162 ms          QRS Dur : 102 ms      QT Int : 390 ms       P-R-T Axes : -01 -03 036 degrees     QTc Int :  438 ms    Normal sinus rhythm  Normal ECG  When compared with ECG of 21-FEB-2020 09:33,  No significant change was found  Confirmed by Jeremy Harkins MD ([id number]) on 6/14/2020 5:53:36 PM    Referred By:  STANLEY           Confirmed By:Jeremy Harkins MD

## 2020-06-15 ENCOUNTER — CLINICAL SUPPORT (OUTPATIENT)
Dept: CARDIOLOGY | Facility: HOSPITAL | Age: 85
End: 2020-06-15
Attending: INTERNAL MEDICINE
Payer: MEDICARE

## 2020-06-15 ENCOUNTER — HOSPITAL ENCOUNTER (OUTPATIENT)
Dept: RADIOLOGY | Facility: HOSPITAL | Age: 85
Discharge: HOME | End: 2020-06-15
Attending: INTERNAL MEDICINE
Payer: MEDICARE

## 2020-06-15 ENCOUNTER — CLINICAL SUPPORT (OUTPATIENT)
Dept: CARDIOLOGY | Facility: HOSPITAL | Age: 85
End: 2020-06-15
Attending: SPECIALIST
Payer: MEDICARE

## 2020-06-15 VITALS
DIASTOLIC BLOOD PRESSURE: 59 MMHG | SYSTOLIC BLOOD PRESSURE: 128 MMHG | HEIGHT: 68 IN | BODY MASS INDEX: 30.04 KG/M2 | HEART RATE: 90 BPM | TEMPERATURE: 99 F | RESPIRATION RATE: 20 BRPM | WEIGHT: 198.19 LBS | OXYGEN SATURATION: 96 %

## 2020-06-15 LAB
ANION GAP SERPL CALC-SCNC: 8 MMOL/L (ref 8–16)
AORTIC ROOT ANNULUS: 2.67 CM
AORTIC VALVE CUSP SEPERATION: 1.32 CM
AV INDEX (PROSTH): 0.94
AV MEAN GRADIENT: 5 MMHG
AV PEAK GRADIENT: 9 MMHG
AV VALVE AREA: 3.01 CM2
AV VELOCITY RATIO: 99.42
BSA FOR ECHO PROCEDURE: 2.08 M2
BUN SERPL-MCNC: 17 MG/DL (ref 8–23)
CALCIUM SERPL-MCNC: 8.5 MG/DL (ref 8.7–10.5)
CHLORIDE SERPL-SCNC: 108 MMOL/L (ref 95–110)
CHOLEST SERPL-MCNC: 183 MG/DL (ref 120–199)
CHOLEST/HDLC SERPL: 4.4 {RATIO} (ref 2–5)
CO2 SERPL-SCNC: 25 MMOL/L (ref 23–29)
CREAT SERPL-MCNC: 0.8 MG/DL (ref 0.5–1.4)
CV ECHO LV RWT: 0.47 CM
CV PHARM DOSE: 0.4 MG
CV STRESS BASE HR: 80 BPM
DIASTOLIC BLOOD PRESSURE: 60 MMHG
DOP CALC AO PEAK VEL: 1.49 M/S
DOP CALC AO VTI: 26.43 CM
DOP CALC LVOT AREA: 3.2 CM2
DOP CALC LVOT DIAMETER: 2.02 CM
DOP CALC LVOT PEAK VEL: 148.13 M/S
DOP CALC LVOT STROKE VOLUME: 79.6 CM3
DOP CALCLVOT PEAK VEL VTI: 24.85 CM
E WAVE DECELERATION TIME: 290.07 MSEC
E/A RATIO: 0.67
E/E' RATIO: 11.43 M/S
ECHO LV POSTERIOR WALL: 1.1 CM (ref 0.6–1.1)
ERYTHROCYTE [DISTWIDTH] IN BLOOD BY AUTOMATED COUNT: 14.4 % (ref 11.5–14.5)
EST. GFR  (AFRICAN AMERICAN): >60 ML/MIN/1.73 M^2
EST. GFR  (NON AFRICAN AMERICAN): >60 ML/MIN/1.73 M^2
ESTIMATED AVG GLUCOSE: 114 MG/DL (ref 68–131)
FRACTIONAL SHORTENING: 38 % (ref 28–44)
GLUCOSE SERPL-MCNC: 107 MG/DL (ref 70–110)
HBA1C MFR BLD HPLC: 5.6 % (ref 4.5–6.2)
HCT VFR BLD AUTO: 41.3 % (ref 37–48.5)
HDLC SERPL-MCNC: 42 MG/DL (ref 40–75)
HDLC SERPL: 23 % (ref 20–50)
HGB BLD-MCNC: 13.4 G/DL (ref 12–16)
INTERVENTRICULAR SEPTUM: 1.11 CM (ref 0.6–1.1)
LDLC SERPL CALC-MCNC: 116.4 MG/DL (ref 63–159)
LEFT ATRIUM SIZE: 2.6 CM
LEFT INTERNAL DIMENSION IN SYSTOLE: 2.94 CM (ref 2.1–4)
LEFT VENTRICLE DIASTOLIC VOLUME INDEX: 27.7 ML/M2
LEFT VENTRICLE DIASTOLIC VOLUME: 56.4 ML
LEFT VENTRICLE MASS INDEX: 93 G/M2
LEFT VENTRICLE SYSTOLIC VOLUME INDEX: 13.2 ML/M2
LEFT VENTRICLE SYSTOLIC VOLUME: 26.9 ML
LEFT VENTRICULAR INTERNAL DIMENSION IN DIASTOLE: 4.71 CM (ref 3.5–6)
LEFT VENTRICULAR MASS: 189.37 G
LV LATERAL E/E' RATIO: 11.43 M/S
LV SEPTAL E/E' RATIO: 11.43 M/S
MAGNESIUM SERPL-MCNC: 2 MG/DL (ref 1.6–2.6)
MCH RBC QN AUTO: 29.5 PG (ref 27–31)
MCHC RBC AUTO-ENTMCNC: 32.4 G/DL (ref 32–36)
MCV RBC AUTO: 91 FL (ref 82–98)
MV PEAK A VEL: 1.19 M/S
MV PEAK E VEL: 0.8 M/S
NONHDLC SERPL-MCNC: 141 MG/DL
OHS CV CPX 85 PERCENT MAX PREDICTED HEART RATE MALE: 114
OHS CV CPX MAX PREDICTED HEART RATE: 134
OHS CV CPX PATIENT IS FEMALE: 1
OHS CV CPX PATIENT IS MALE: 0
OHS CV CPX PEAK DIASTOLIC BLOOD PRESSURE: 54 MMHG
OHS CV CPX PEAK HEAR RATE: 117 BPM
OHS CV CPX PEAK RATE PRESSURE PRODUCT: NORMAL
OHS CV CPX PEAK SYSTOLIC BLOOD PRESSURE: 195 MMHG
OHS CV CPX PERCENT MAX PREDICTED HEART RATE ACHIEVED: 87
OHS CV CPX RATE PRESSURE PRODUCT PRESENTING: NORMAL
PISA TR MAX VEL: 2.27 M/S
PLATELET # BLD AUTO: 220 K/UL (ref 150–350)
PMV BLD AUTO: 10.9 FL (ref 9.2–12.9)
POTASSIUM SERPL-SCNC: 4.1 MMOL/L (ref 3.5–5.1)
PV PEAK VELOCITY: 95.82 CM/S
RA PRESSURE: 3 MMHG
RBC # BLD AUTO: 4.54 M/UL (ref 4–5.4)
SODIUM SERPL-SCNC: 141 MMOL/L (ref 136–145)
STRESS ECHO TARGET HR: 117 BPM
SYSTOLIC BLOOD PRESSURE: 165 MMHG
TDI LATERAL: 0.07 M/S
TDI SEPTAL: 0.07 M/S
TDI: 0.07 M/S
TR MAX PG: 21 MMHG
TRIGL SERPL-MCNC: 123 MG/DL (ref 30–150)
TROPONIN I SERPL DL<=0.01 NG/ML-MCNC: <0.03 NG/ML
TV REST PULMONARY ARTERY PRESSURE: 24 MMHG
WBC # BLD AUTO: 8.66 K/UL (ref 3.9–12.7)

## 2020-06-15 PROCEDURE — 36415 COLL VENOUS BLD VENIPUNCTURE: CPT

## 2020-06-15 PROCEDURE — 25000003 PHARM REV CODE 250: Performed by: INTERNAL MEDICINE

## 2020-06-15 PROCEDURE — 84484 ASSAY OF TROPONIN QUANT: CPT

## 2020-06-15 PROCEDURE — 96372 THER/PROPH/DIAG INJ SC/IM: CPT | Mod: 59

## 2020-06-15 PROCEDURE — 80061 LIPID PANEL: CPT

## 2020-06-15 PROCEDURE — 93017 CV STRESS TEST TRACING ONLY: CPT

## 2020-06-15 PROCEDURE — 83036 HEMOGLOBIN GLYCOSYLATED A1C: CPT

## 2020-06-15 PROCEDURE — G0378 HOSPITAL OBSERVATION PER HR: HCPCS

## 2020-06-15 PROCEDURE — 85027 COMPLETE CBC AUTOMATED: CPT

## 2020-06-15 PROCEDURE — A9502 TC99M TETROFOSMIN: HCPCS

## 2020-06-15 PROCEDURE — 63600175 PHARM REV CODE 636 W HCPCS: Performed by: INTERNAL MEDICINE

## 2020-06-15 PROCEDURE — 93306 TTE W/DOPPLER COMPLETE: CPT

## 2020-06-15 PROCEDURE — 96376 TX/PRO/DX INJ SAME DRUG ADON: CPT | Mod: 59

## 2020-06-15 PROCEDURE — 83735 ASSAY OF MAGNESIUM: CPT

## 2020-06-15 PROCEDURE — 80048 BASIC METABOLIC PNL TOTAL CA: CPT

## 2020-06-15 RX ORDER — HYDRALAZINE HYDROCHLORIDE 25 MG/1
25 TABLET, FILM COATED ORAL EVERY 12 HOURS
Qty: 60 TABLET | Refills: 11 | Status: SHIPPED | OUTPATIENT
Start: 2020-06-15 | End: 2020-09-18 | Stop reason: SDUPTHER

## 2020-06-15 RX ORDER — REGADENOSON 0.08 MG/ML
0.4 INJECTION, SOLUTION INTRAVENOUS
Status: COMPLETED | OUTPATIENT
Start: 2020-06-15 | End: 2020-06-15

## 2020-06-15 RX ORDER — LISINOPRIL 5 MG/1
5 TABLET ORAL DAILY
Status: DISCONTINUED | OUTPATIENT
Start: 2020-06-16 | End: 2020-06-15

## 2020-06-15 RX ORDER — LISINOPRIL 5 MG/1
10 TABLET ORAL DAILY
Qty: 180 TABLET | Refills: 3 | Status: SHIPPED | OUTPATIENT
Start: 2020-06-16 | End: 2020-09-08

## 2020-06-15 RX ORDER — LISINOPRIL 10 MG/1
10 TABLET ORAL DAILY
Status: DISCONTINUED | OUTPATIENT
Start: 2020-06-16 | End: 2020-06-15

## 2020-06-15 RX ORDER — HYDROCHLOROTHIAZIDE 25 MG/1
25 TABLET ORAL
Status: DISCONTINUED | OUTPATIENT
Start: 2020-06-15 | End: 2020-06-15 | Stop reason: HOSPADM

## 2020-06-15 RX ORDER — LISINOPRIL 10 MG/1
10 TABLET ORAL DAILY
Status: DISCONTINUED | OUTPATIENT
Start: 2020-06-15 | End: 2020-06-15 | Stop reason: HOSPADM

## 2020-06-15 RX ORDER — ATORVASTATIN CALCIUM 40 MG/1
40 TABLET, FILM COATED ORAL DAILY
Qty: 90 TABLET | Refills: 3 | Status: SHIPPED | OUTPATIENT
Start: 2020-06-15 | End: 2020-09-08 | Stop reason: ALTCHOICE

## 2020-06-15 RX ORDER — HYDROCHLOROTHIAZIDE 25 MG/1
25 TABLET ORAL
Qty: 8 TABLET | Refills: 3 | Status: SHIPPED | OUTPATIENT
Start: 2020-06-15 | End: 2021-01-12

## 2020-06-15 RX ORDER — LISINOPRIL 5 MG/1
5 TABLET ORAL DAILY
Qty: 90 TABLET | Refills: 3 | Status: SHIPPED | OUTPATIENT
Start: 2020-06-16 | End: 2020-06-15

## 2020-06-15 RX ORDER — HYDRALAZINE HYDROCHLORIDE 25 MG/1
25 TABLET, FILM COATED ORAL EVERY 12 HOURS
Status: DISCONTINUED | OUTPATIENT
Start: 2020-06-15 | End: 2020-06-15 | Stop reason: HOSPADM

## 2020-06-15 RX ADMIN — ENOXAPARIN SODIUM 40 MG: 100 INJECTION SUBCUTANEOUS at 04:06

## 2020-06-15 RX ADMIN — ACETAMINOPHEN 650 MG: 325 TABLET ORAL at 01:06

## 2020-06-15 RX ADMIN — HYDRALAZINE HYDROCHLORIDE 10 MG: 20 INJECTION INTRAMUSCULAR; INTRAVENOUS at 05:06

## 2020-06-15 RX ADMIN — LISINOPRIL 10 MG: 10 TABLET ORAL at 03:06

## 2020-06-15 RX ADMIN — HYDROCHLOROTHIAZIDE 25 MG: 25 TABLET ORAL at 02:06

## 2020-06-15 RX ADMIN — AMLODIPINE BESYLATE 5 MG: 5 TABLET ORAL at 11:06

## 2020-06-15 RX ADMIN — REGADENOSON 0.4 MG: 0.08 INJECTION, SOLUTION INTRAVENOUS at 08:06

## 2020-06-15 NOTE — DISCHARGE SUMMARY
Replaced by [facility]  Discharge Summary      Patient Name: Sofya Carmen  MRN: [id number]  Admission Date: 6/14/2020  Hospital Length of Stay: 0 days  Discharge Date and Time:  06/15/2020 2:03 PM  Attending Physician: Octavia Corona MD   Discharging Provider: Octavia Corona MD  Primary Care Provider: Primary Doctor No      HPI:   Patient is a 84-year-old  female with strong family history of CAD, personal history of CAD status post CABG in 2017, essential hypertension and chronic hearing loss who presented to the ED with chief complaint of elevated blood pressure.  Had a transient episode of chest tightness while in the ED which has since resolved.  She is currently on amlodipine 5 mg twice daily at home and reports taking it religiously.  She was recently placed on nitrofurantoin for UTI.      She currently endorses generalized headache which is dull with no radiation.  No exacerbating or relieving factors.  She denies chest pain or chest pressure.  She also denies shortness of breath, fever, chills, abdominal pain or diarrhea/constipation.  She is anxious about getting admitted under observation status.  She is also worried that her hearing aid battery might die soon.    Follows with Dr. Barahona from cardiology.  She was admitted with chest pain in October 2019 and underwent stress test which was negative for ischemia.    In the ED:  Initial vital signs with blood pressure of 232/103 mmHg which gradually improved to 160s/70s after 10 mg of IV hydralazine x2.  She received full-dose aspirin in the ED.  Initial troponin within normal limits.  EKG without any acute ST T wave changes.    Rest of the 10 point review of systems is negative except as mentioned above.    * No surgery found *      Hospital Course:   Patient was admitted under observation for the evaluation of chest pain with uncontrolled blood pressure, started on lisinopril along with her amlodipine.  Blood  pressure remained uncontrolled, apparently patient did not received her lisinopril last night as she reported it would not work(the patient told me she did not really understand the question), she underwent a nuclear and exercise stress test, which was unremarkable for reversible ischemia.  Patient was also consulted to Cardiology who recommends to add hydrochlorothiazide twice weekly 25 mg, echo obtain showed concentric hypertrophy with EF of 75%.  Patient not willing to take multiple medicines, including cholesterol medicine.  Reinforced her about the importance of uncontrolled blood pressure and preventions of end-organ damage.  Patient verbalized understanding.  Recommended to follow-up outpatient cardiology in 2 weeks.  Educated to document her blood pressure daily at home and bring the chart office for follow-up.  Can increase the lisinopril to 10 mg if her blood pressure persistently elevated.  Instructions provided to follow up with primary care physician as outpatient. Patient verbalized understanding and is aware to contact primary care physician or return to ED if new or worsening symptoms.    Physical exam on the day of discharge:  General: Patient resting comfortably in no acute distress.  Lungs: CTA. Good air entry.  Cor: Regular rate and rhythm. No murmurs. No pedal edema.  Abd: Soft. Nontender. Non-distended.  Neuro: A&O x3. Moving all 4 extremities equally  Ext: No clubbing. No cyanosis.     Addendum:  Patient remained hypertensive p.o. to discharge in spite of all p.o. medications, received IV hydralazine,  Lisinopril dose increased to 10 mg and hydralazine 25 mg b.i.d. p.r.n. prescribed for systolic blood pressure more than 160mmhg.         Consults:   Consults (From admission, onward)        Status Ordering Provider     Inpatient consult to Cardiology  Once     Provider:  Willie Barahona MD    Acknowledged IRAJ KULKARNI     Inpatient consult to Hospitalist  Once     Provider:  Iraj  MD Hodan Cruz RAJIV          No new Assessment & Plan notes have been filed under this hospital service since the last note was generated.  Service: Hospital Medicine    Final Active Diagnoses:    Diagnosis Date Noted POA    PRINCIPAL PROBLEM:  Chest pain [R07.9] 06/14/2020 Yes    Hypertensive urgency [I16.0] 06/14/2020 Yes    S/P CABG (coronary artery bypass graft) [Z95.1] 12/27/2017 Not Applicable      Problems Resolved During this Admission:       Discharged Condition: good    Disposition: Home or Self Care    Follow Up:  Follow-up Information     Joshua Streeter MD In 2 weeks.    Specialties: Cardiovascular Disease, Cardiology  Contact information:  [address] [place] [address]  [address]  [facility] [address]  [phone]                 Patient Instructions:      Diet Cardiac     Notify your health care provider if you experience any of the following:  temperature >100.4     Notify your health care provider if you experience any of the following:  persistent nausea and vomiting or diarrhea     Notify your health care provider if you experience any of the following:  persistent dizziness, light-headedness, or visual disturbances     Activity as tolerated       Significant Diagnostic Studies: Labs:   BMP:   Recent Labs   Lab 06/14/20  1336 06/15/20  0427    107    141   K 4.0 4.1    108   CO2 24 25   BUN 15 17   CREATININE 0.8 0.8   CALCIUM 9.6 8.5*   MG 2.0 2.0    and CBC   Recent Labs   Lab 06/14/20  1336 06/15/20  0427   WBC 8.00 8.66   HGB 14.5 13.4   HCT 44.0 41.3    220       Pending Diagnostic Studies:     None         Medications:  Reconciled Home Medications:      Medication List      START taking these medications    atorvastatin 40 MG tablet  Commonly known as: LIPITOR  Take 1 tablet (40 mg total) by mouth once daily.     hydrALAZINE 25 MG tablet  Commonly known as: APRESOLINE  Take 1 tablet (25 mg total) by mouth every 12 (twelve) hours.     hydroCHLOROthiazide  25 MG tablet  Commonly known as: HYDRODIURIL  Take 1 tablet (25 mg total) by mouth twice a week.     lisinopriL 5 MG tablet  Commonly known as: PRINIVIL,ZESTRIL  Take 2 tablets (10 mg total) by mouth once daily.  Start taking on: June 16, 2020        CONTINUE taking these medications    amLODIPine 5 MG tablet  Commonly known as: NORVASC  Take 1 tablet (5 mg total) by mouth 2 (two) times daily.     aspirin 81 MG Chew  Take 1 tablet (81 mg total) by mouth once daily.     nitrofurantoin (macrocrystal-monohydrate) 100 MG capsule  Commonly known as: MACROBID  Take 100 mg by mouth 2 (two) times daily. For 5 days     nitroGLYCERIN 0.4 MG SL tablet  Commonly known as: NITROSTAT  Place 1 tablet (0.4 mg total) under the tongue every 5 (five) minutes as needed for Chest pain.            Indwelling Lines/Drains at time of discharge:   Lines/Drains/Airways     None                 Time spent on the discharge of patient: 32 minutes  Patient was seen and examined on the date of discharge and determined to be suitable for discharge.         Octavia Corona MD  Department of Hospital Medicine  [facility]

## 2020-06-15 NOTE — HOSPITAL COURSE
Patient was admitted under observation for the evaluation of chest pain with uncontrolled blood pressure, started on lisinopril along with her amlodipine.  Blood pressure remained uncontrolled, apparently patient did not received her lisinopril last night as she reported it would not work(the patient told me she did not really understand the question), she underwent a nuclear and exercise stress test, which was unremarkable for reversible ischemia.  Patient was also consulted to Cardiology who recommends to add hydrochlorothiazide twice weekly 25 mg, echo obtain showed concentric hypertrophy with EF of 75%.  Patient not willing to take multiple medicines, including cholesterol medicine.  Reinforced her about the importance of uncontrolled blood pressure and preventions of end-organ damage.  Patient verbalized understanding.  Recommended to follow-up outpatient cardiology in 2 weeks.  Educated to document her blood pressure daily at home and bring the chart office for follow-up.  Can increase the lisinopril to 10 mg if her blood pressure persistently elevated.  Instructions provided to follow up with primary care physician as outpatient. Patient verbalized understanding and is aware to contact primary care physician or return to ED if new or worsening symptoms.    Physical exam on the day of discharge:  General: Patient resting comfortably in no acute distress.  Lungs: CTA. Good air entry.  Cor: Regular rate and rhythm. No murmurs. No pedal edema.  Abd: Soft. Nontender. Non-distended.  Neuro: A&O x3. Moving all 4 extremities equally  Ext: No clubbing. No cyanosis.     Addendum:  Patient remained hypertensive p.o. to discharge in spite of all p.o. medications, received IV hydralazine,  Lisinopril dose increased to 10 mg and hydralazine 25 mg b.i.d. p.r.n. prescribed for systolic blood pressure more than 160mmhg.

## 2020-06-15 NOTE — PLAN OF CARE
06/15/20 1356   Final Note   Assessment Type Final Discharge Note   Anticipated Discharge Disposition Home

## 2020-06-15 NOTE — CONSULTS
[facility]  Cardiology  Consult Note    Patient Name: Sofya Carmen  MRN: [id number]  Admission Date: 6/14/2020  Hospital Length of Stay: 0 days  Code Status: Full Code   Attending Provider: Octavia Corona MD   Consulting Provider: Joshua Streeter MD  Primary Care Physician: Primary Doctor No  Principal Problem:Chest pain    Patient information was obtained from patient and ER records.     Consults  Subjective:   Elevated blood pressure  Chief Complaint:       HPI:  This is a pleasant 84-year-old woman who had recent urinary tract infection and was treated with Macrobid.  She states she has not felt well since she has been on Macrobid and her blood pressures been elevated despite Norvasc 5 b.i.d. other is a question as to whether she had chest discomfort when she came in the emergency room.  She has had no cardiovascular symptoms recently.  She has previous CABG 2017.  She has been on different blood pressure medications in the past including beta-blockers but was not able to tolerate them.  She is not been on a or bees that she is aware of.  She is not having urinary tract symptoms now.    Past Medical History:   Diagnosis Date    Hearing loss, left     White coat hypertension        Past Surgical History:   Procedure Laterality Date    BUNIONECTOMY      EYE SURGERY      cataract    HEMORRHOID SURGERY      SHOULDER SURGERY         Review of patient's allergies indicates:   Allergen Reactions    [place] thyroid  [thyroid (pork)]      Other reaction(s): palpitations       No current facility-administered medications on file prior to encounter.      Current Outpatient Medications on File Prior to Encounter   Medication Sig    amLODIPine (NORVASC) 5 MG tablet Take 1 tablet (5 mg total) by mouth 2 (two) times daily.    aspirin 81 MG Chew Take 1 tablet (81 mg total) by mouth once daily.    nitrofurantoin, macrocrystal-monohydrate, (MACROBID) 100 MG capsule Take 100 mg by mouth 2 (two) times  daily. For 5 days    nitroGLYCERIN (NITROSTAT) 0.4 MG SL tablet Place 1 tablet (0.4 mg total) under the tongue every 5 (five) minutes as needed for Chest pain.     Family History     Problem Relation (Age of Onset)    Heart disease Mother, Father        Tobacco Use    Smoking status: Never Smoker   Substance and Sexual Activity    Alcohol use: No     Comment: occasionally    Drug use: No    Sexual activity: Yes     Birth control/protection: Post-menopausal     Review of Systems   Constitution: Negative.   HENT:        She has hearing loss   Eyes: Negative.    Cardiovascular: Negative.         Has history of CABG but no chest pain since then   Respiratory: Negative.    Skin: Negative.    Musculoskeletal:        She denies use of nonsteroidals   Gastrointestinal: Negative.    Genitourinary: Positive for urgency.        History of recent UTI   Neurological: Negative.    Psychiatric/Behavioral: Negative.      Objective:     Vital Signs (Most Recent):  Temp: 98.6 °F (37 °C) (06/15/20 0716)  Pulse: 77 (06/15/20 0716)  Resp: 18 (06/15/20 0716)  BP: (!) 159/70 (06/15/20 0716)  SpO2: 98 % (06/15/20 0716) Vital Signs (24h Range):  Temp:  [98.1 °F (36.7 °C)-98.8 °F (37.1 °C)] 98.6 °F (37 °C)  Pulse:  [69-99] 77  Resp:  [10-26] 18  SpO2:  [92 %-99 %] 98 %  BP: (129-232)/() 159/70     Weight: 89.9 kg (198 lb 3.1 oz)  Body mass index is 30.14 kg/m².    SpO2: 98 %  O2 Device (Oxygen Therapy): room air      Intake/Output Summary (Last 24 hours) at 6/15/2020 0909  Last data filed at 6/15/2020 0538  Gross per 24 hour   Intake 1100 ml   Output 250 ml   Net 850 ml       Lines/Drains/Airways     Peripheral Intravenous Line                 Peripheral IV - Single Lumen 06/14/20 1350 20 G Right Antecubital less than 1 day                Physical Exam pressure 150/80  Head neck no carotid bruits  Lungs clear  Cardiac sounds are normal number  Abdomen soft no masses  Extremities no edema excellent pulses with good  elasticity    Significant Labs:   CMP   Recent Labs   Lab 06/14/20  1336 06/15/20  0427    141   K 4.0 4.1    108   CO2 24 25    107   BUN 15 17   CREATININE 0.8 0.8   CALCIUM 9.6 8.5*   PROT 7.7  --    ALBUMIN 4.2  --    BILITOT 0.6  --    ALKPHOS 52*  --    AST 17  --    ALT 17  --    ANIONGAP 12 8   ESTGFRAFRICA >60.0 >60.0   EGFRNONAA >60.0 >60.0   , CBC   Recent Labs   Lab 06/14/20  1336 06/15/20  0427   WBC 8.00 8.66   HGB 14.5 13.4   HCT 44.0 41.3    220    and Troponin   Recent Labs   Lab 06/14/20  1336 06/15/20  0427   TROPONINI <0.030 <0.030       Significant Imaging:   Assessment and Plan:    Status post urinary tract infection treated with Macrobid and no evidence of active infection at the current time  2.  ASCVD with no significant cardiac symptoms although a question of chest pressure when her blood pressure is very high in the emergency room.  Patient is having nuclear stress test this morning  3.  High blood pressure.  She is on amlodipine 5 b.i.d. at home.  Electrocardiogram does not demonstrate left ventricle hypertrophy.  Ace or Arb therapy is indicated information has been started on lisinopril 5 mg a day.  If blood pressure remains slightly elevated consider adding diuretic q.o.d.   and check echocardiogram for diastolic dysfunction and hypertrophy  I personally reviewed chart and imaging studies ,examined patient, and discussed with the patient her illness and treatment including diet and follow-up care. This consult was prolonged and required approximately 50% time for review and 50% time examining patient and writing notes and orders                 Active Diagnoses:    Diagnosis Date Noted POA    PRINCIPAL PROBLEM:  Chest pain [R07.9] 06/14/2020 Yes    Hypertensive urgency [I16.0] 06/14/2020 Yes    S/P CABG (coronary artery bypass graft) [Z95.1] 12/27/2017 Not Applicable      Problems Resolved During this Admission:       VTE Risk Mitigation (From admission,  onward)         Ordered     enoxaparin injection 40 mg  Every 24 hours      06/14/20 1919     IP VTE HIGH RISK PATIENT  Once      06/14/20 1919     Place sequential compression device  Until discontinued      06/14/20 1919                Thank you for your consult.     Joshua Streeter MD  Cardiology   Replaced by [facility]

## 2020-06-15 NOTE — H&P
[facility]  History & Physical    Patient Name: Sofya Carmen  MRN: [id number]  Admission Date: 6/14/2020  Attending Physician: David Cruz MD  Primary Care Provider: Primary Doctor No         Patient information was obtained from patient, past medical records and ER records.     Subjective:     Principal Problem:Chest pain    Chief Complaint:   Chief Complaint   Patient presents with    Hypertension     X FEW DAYS, SEEN AT URGENT CARE AND TOLD SHE ALSO HAS AN UTI, FEELING BAD        HPI: Patient is a 84-year-old  female with strong family history of CAD, personal history of CAD status post CABG in 2017, essential hypertension and chronic hearing loss who presented to the ED with chief complaint of elevated blood pressure.  Had a transient episode of chest tightness while in the ED which has since resolved.  She is currently on amlodipine 5 mg twice daily at home and reports taking it religiously.  She was recently placed on nitrofurantoin for UTI.      She currently endorses generalized headache which is dull with no radiation.  No exacerbating or relieving factors.  She denies chest pain or chest pressure.  She also denies shortness of breath, fever, chills, abdominal pain or diarrhea/constipation.  She is anxious about getting admitted under observation status.  She is also worried that her hearing aid battery might die soon.    Follows with Dr. Barahona from cardiology.  She was admitted with chest pain in October 2019 and underwent stress test which was negative for ischemia.    In the ED:  Initial vital signs with blood pressure of 232/103 mmHg which gradually improved to 160s/70s after 10 mg of IV hydralazine x2.  She received full-dose aspirin in the ED.  Initial troponin within normal limits.  EKG without any acute ST T wave changes.    Rest of the 10 point review of systems is negative except as mentioned above.    Past Medical History:   Diagnosis Date     Hearing loss, left     White coat hypertension        Past Surgical History:   Procedure Laterality Date    BUNIONECTOMY      EYE SURGERY      cataract    HEMORRHOID SURGERY      SHOULDER SURGERY         Review of patient's allergies indicates:   Allergen Reactions    [place] thyroid  [thyroid (pork)]      Other reaction(s): palpitations       No current facility-administered medications on file prior to encounter.      Current Outpatient Medications on File Prior to Encounter   Medication Sig    amLODIPine (NORVASC) 5 MG tablet Take 1 tablet (5 mg total) by mouth 2 (two) times daily.    aspirin 81 MG Chew Take 1 tablet (81 mg total) by mouth once daily.    nitrofurantoin, macrocrystal-monohydrate, (MACROBID) 100 MG capsule Take 100 mg by mouth 2 (two) times daily. For 5 days    nitroGLYCERIN (NITROSTAT) 0.4 MG SL tablet Place 1 tablet (0.4 mg total) under the tongue every 5 (five) minutes as needed for Chest pain.     Family History     Problem Relation (Age of Onset)    Heart disease Mother, Father        Tobacco Use    Smoking status: Never Smoker   Substance and Sexual Activity    Alcohol use: No     Comment: occasionally    Drug use: No    Sexual activity: Yes     Birth control/protection: Post-menopausal       Objective:     Vital Signs (Most Recent):  Temp: 98.2 °F (36.8 °C) (06/14/20 1320)  Pulse: 86 (06/14/20 1718)  Resp: 12 (06/14/20 1718)  BP: (!) 162/72 (06/14/20 1715)  SpO2: 97 % (06/14/20 1718) Vital Signs (24h Range):  Temp:  [98.2 °F (36.8 °C)] 98.2 °F (36.8 °C)  Pulse:  [69-99] 86  Resp:  [10-26] 12  SpO2:  [96 %-99 %] 97 %  BP: (152-232)/() 162/72     Weight: 86.2 kg (190 lb)  Body mass index is 28.47 kg/m².    Physical Exam  Vitals signs and nursing note reviewed.   Constitutional:       General: She is not in acute distress.     Appearance: She is well-developed.   HENT:      Head: Normocephalic and atraumatic.      Ears:      Comments: Hearing loss noted  Eyes:      General:  No scleral icterus.     Conjunctiva/sclera: Conjunctivae normal.      Pupils: Pupils are equal, round, and reactive to light.   Neck:      Musculoskeletal: Neck supple.      Thyroid: No thyromegaly.   Cardiovascular:      Rate and Rhythm: Normal rate and regular rhythm.      Heart sounds: Normal heart sounds. No murmur.   Pulmonary:      Effort: Pulmonary effort is normal. No respiratory distress.      Breath sounds: Normal breath sounds.   Abdominal:      General: Bowel sounds are normal.      Palpations: Abdomen is soft.      Tenderness: There is no abdominal tenderness.   Musculoskeletal:         General: No deformity.   Skin:     General: Skin is warm and dry.      Capillary Refill: Capillary refill takes less than 2 seconds.      Findings: No erythema.   Neurological:      General: No focal deficit present.      Mental Status: She is alert.   Psychiatric:         Behavior: Behavior normal.      Comments: Anxious and worried           CRANIAL NERVES     CN III, IV, VI   Pupils are equal, round, and reactive to light.       Significant Labs:   CBC:   Recent Labs   Lab 06/14/20  1336   WBC 8.00   HGB 14.5   HCT 44.0        CMP:   Recent Labs   Lab 06/14/20  1336      K 4.0      CO2 24      BUN 15   CREATININE 0.8   CALCIUM 9.6   PROT 7.7   ALBUMIN 4.2   BILITOT 0.6   ALKPHOS 52*   AST 17   ALT 17   ANIONGAP 12   EGFRNONAA >60.0     Troponin:   Recent Labs   Lab 06/14/20  1336   TROPONINI <0.030       Significant Imaging: I have reviewed all pertinent imaging results/findings within the past 24 hours.     Results for orders placed or performed during the hospital encounter of 06/14/20   EKG 12-lead    Collection Time: 06/14/20  1:26 PM    Narrative    Test Reason : I10,    Vent. Rate : 076 BPM     Atrial Rate : 076 BPM     P-R Int : 162 ms          QRS Dur : 102 ms      QT Int : 390 ms       P-R-T Axes : -01 -03 036 degrees     QTc Int : 438 ms    Normal sinus rhythm  Normal ECG  When  compared with ECG of 21-FEB-2020 09:33,  No significant change was found  Confirmed by Jeremy Harkins MD ([id number]) on 6/14/2020 5:53:36 PM    Referred By:  STANLEY           Confirmed By:Jeremy Harkins MD         Assessment/Plan:     Active Hospital Problems    Diagnosis  POA    *Chest pain [R07.9]  Yes    Hypertensive urgency [I16.0]  Yes    S/P CABG (coronary artery bypass graft) [Z95.1]  Not Applicable      Resolved Hospital Problems   No resolved problems to display.       Plan:  Admit to telemetry unit for cardiac monitoring   Control blood pressure. Continue amlodipine 5 mg BID  Start lisinopril 5 mg. IV hydralazine prn with parameters  Trend troponins. Check lipid panel and hemoglobin A1c   Given risk factors for CAD, we will obtain stress test and cardiology consultation (Dr. Barahona)  Start aspirin and statin  Continue nitrofurantoin for UTI       VTE Risk Mitigation (From admission, onward)         Ordered     enoxaparin injection 40 mg  Every 24 hours      06/14/20 1919     IP VTE HIGH RISK PATIENT  Once      06/14/20 1919     Place sequential compression device  Until discontinued      06/14/20 1919                   David Cruz MD  Department of Hospital Medicine   [facility]

## 2020-08-05 ENCOUNTER — PATIENT OUTREACH (OUTPATIENT)
Dept: ADMINISTRATIVE | Facility: HOSPITAL | Age: 85
End: 2020-08-05

## 2020-08-05 NOTE — LETTER
August 5, 2020    Sofya Carmen  [address] [place] [address]             [facility]  [address]  [facility] [address]  Phone: [phone] [facility].  To help you get the most out of each of your visits, we will review your information to make sure you are up to date on all of your recommended tests and/or procedures.      As a new patient to LATRICE ELLIOTT MD, we may not have your complete medical records.  He has found that your chart shows you may be due for:    Health Maintenance Due   Topic Date Due    TETANUS VACCINE  08/26/1955    DEXA SCAN  08/26/1977    Shingles Vaccine (1 of 2) 08/26/1987    Pneumococcal Vaccine (65+ Low/Medium Risk) (1 of 2 - PCV13) 08/26/2002     If you have had any of the above done at another facility, please let us know so that we may obtain copies from that facility.  If you have a copy of these records, please provide a copy for us to scan into your chart.    If you are currently taking medication, please bring it with you to your appointment for review.    Also, if you have any type of Advanced Directives, please bring them with you to your office visit so we may scan them into your chart.      Thank You,  Your [facility] Team  Evelyn Cameron L.P.N. Clinical Care Coordinator  [address] [place] [address], [place] [address] [phone]

## 2020-08-19 ENCOUNTER — OFFICE VISIT (OUTPATIENT)
Dept: FAMILY MEDICINE | Facility: CLINIC | Age: 85
End: 2020-08-19
Payer: MEDICARE

## 2020-08-19 VITALS
WEIGHT: 193.69 LBS | TEMPERATURE: 99 F | HEIGHT: 69 IN | HEART RATE: 77 BPM | OXYGEN SATURATION: 97 % | RESPIRATION RATE: 20 BRPM | BODY MASS INDEX: 28.69 KG/M2 | SYSTOLIC BLOOD PRESSURE: 130 MMHG | DIASTOLIC BLOOD PRESSURE: 70 MMHG

## 2020-08-19 DIAGNOSIS — I10 HYPERTENSION, UNSPECIFIED TYPE: Primary | ICD-10-CM

## 2020-08-19 DIAGNOSIS — Z78.0 ASYMPTOMATIC MENOPAUSAL STATE: ICD-10-CM

## 2020-08-19 PROCEDURE — 99999 PR PBB SHADOW E&M-EST. PATIENT-LVL IV: CPT | Mod: PBBFAC,,, | Performed by: FAMILY MEDICINE

## 2020-08-19 PROCEDURE — 3075F SYST BP GE 130 - 139MM HG: CPT | Mod: CPTII,S$GLB,, | Performed by: FAMILY MEDICINE

## 2020-08-19 PROCEDURE — 99999 PR PBB SHADOW E&M-EST. PATIENT-LVL IV: ICD-10-PCS | Mod: PBBFAC,,, | Performed by: FAMILY MEDICINE

## 2020-08-19 PROCEDURE — 1159F MED LIST DOCD IN RCRD: CPT | Mod: S$GLB,,, | Performed by: FAMILY MEDICINE

## 2020-08-19 PROCEDURE — 3078F DIAST BP <80 MM HG: CPT | Mod: CPTII,S$GLB,, | Performed by: FAMILY MEDICINE

## 2020-08-19 PROCEDURE — 99203 OFFICE O/P NEW LOW 30 MIN: CPT | Mod: S$GLB,,, | Performed by: FAMILY MEDICINE

## 2020-08-19 PROCEDURE — 3075F PR MOST RECENT SYSTOLIC BLOOD PRESS GE 130-139MM HG: ICD-10-PCS | Mod: CPTII,S$GLB,, | Performed by: FAMILY MEDICINE

## 2020-08-19 PROCEDURE — 1159F PR MEDICATION LIST DOCUMENTED IN MEDICAL RECORD: ICD-10-PCS | Mod: S$GLB,,, | Performed by: FAMILY MEDICINE

## 2020-08-19 PROCEDURE — 3078F PR MOST RECENT DIASTOLIC BLOOD PRESSURE < 80 MM HG: ICD-10-PCS | Mod: CPTII,S$GLB,, | Performed by: FAMILY MEDICINE

## 2020-08-19 PROCEDURE — 99203 PR OFFICE/OUTPT VISIT, NEW, LEVL III, 30-44 MIN: ICD-10-PCS | Mod: S$GLB,,, | Performed by: FAMILY MEDICINE

## 2020-08-19 RX ORDER — AMLODIPINE BESYLATE 10 MG/1
5 TABLET ORAL 2 TIMES DAILY
COMMUNITY
End: 2021-01-07 | Stop reason: SDUPTHER

## 2020-08-19 NOTE — PROGRESS NOTES
"Subjective:       Patient ID: Sofya Carmen is a 84 y.o. female.    Chief Complaint: Establish Care    In regards to the patient's hypertension, patient denies chest pain/sob, and reports compliance with medication regimen.    Review of Systems   Constitutional: Negative for activity change, appetite change, chills, fatigue and fever.   HENT: Negative for congestion, dental problem, facial swelling, nosebleeds, postnasal drip, sinus pain, sore throat, trouble swallowing and voice change.    Eyes: Negative for pain, discharge and visual disturbance.   Respiratory: Negative for apnea, cough, chest tightness and shortness of breath.    Cardiovascular: Negative for chest pain and palpitations.   Gastrointestinal: Negative for abdominal pain, blood in stool, constipation and nausea.   Endocrine: Negative for cold intolerance, polydipsia and polyuria.   Genitourinary: Negative for difficulty urinating, enuresis and flank pain.   Musculoskeletal: Negative for arthralgias and back pain.   Skin: Negative for color change.   Allergic/Immunologic: Negative for environmental allergies and immunocompromised state.   Neurological: Negative for dizziness and light-headedness.   Hematological: Negative for adenopathy.   Psychiatric/Behavioral: Negative for agitation, behavioral problems, decreased concentration and dysphoric mood. The patient is not nervous/anxious.    All other systems reviewed and are negative.        Reviewed family, medical, surgical, and social history.    Objective:      /70 Comment: home reading this AM  Pulse 77   Temp 98.5 °F (36.9 °C) (Oral)   Resp 20   Ht 5' 8.5" (1.74 m)   Wt 87.9 kg (193 lb 11.2 oz)   SpO2 97%   BMI 29.02 kg/m²   Physical Exam  Vitals signs and nursing note reviewed.   Constitutional:       General: She is not in acute distress.     Appearance: She is well-developed. She is not diaphoretic.   HENT:      Head: Normocephalic and atraumatic.      Nose: Nose normal.      " Mouth/Throat:      Pharynx: No oropharyngeal exudate.   Eyes:      General: No scleral icterus.     Conjunctiva/sclera: Conjunctivae normal.      Pupils: Pupils are equal, round, and reactive to light.   Neck:      Musculoskeletal: Normal range of motion and neck supple.      Thyroid: No thyromegaly.   Cardiovascular:      Rate and Rhythm: Normal rate and regular rhythm.      Heart sounds: Normal heart sounds. No murmur. No friction rub. No gallop.    Pulmonary:      Effort: Pulmonary effort is normal. No respiratory distress.      Breath sounds: Normal breath sounds. No wheezing or rales.   Chest:      Chest wall: No tenderness.   Abdominal:      General: Bowel sounds are normal. There is no distension.      Palpations: Abdomen is soft.      Tenderness: There is no abdominal tenderness. There is no guarding.   Musculoskeletal: Normal range of motion.         General: No tenderness or deformity.   Lymphadenopathy:      Cervical: No cervical adenopathy.   Skin:     General: Skin is warm and dry.      Coloration: Skin is not pale.      Findings: No erythema or rash.   Neurological:      Mental Status: She is alert and oriented to person, place, and time.      Cranial Nerves: No cranial nerve deficit.      Sensory: No sensory deficit.      Motor: No abnormal muscle tone.      Deep Tendon Reflexes: Reflexes normal.   Psychiatric:         Behavior: Behavior normal.         Thought Content: Thought content normal.         Judgment: Judgment normal.         Assessment:       1. Hypertension, unspecified type    2. Asymptomatic menopausal state        Plan:       Hypertension, unspecified type    Asymptomatic menopausal state            Risks, benefits, and side effects were discussed with the patient. All questions were answered to the fullest satisfaction of the patient, and pt verbalized understanding and agreement to treatment plan. Pt was to call with any new or worsening symptoms, or present to the ER.

## 2020-09-08 ENCOUNTER — OFFICE VISIT (OUTPATIENT)
Dept: CARDIOLOGY | Facility: CLINIC | Age: 85
End: 2020-09-08
Payer: MEDICARE

## 2020-09-08 VITALS
DIASTOLIC BLOOD PRESSURE: 72 MMHG | BODY MASS INDEX: 29.62 KG/M2 | WEIGHT: 200 LBS | SYSTOLIC BLOOD PRESSURE: 142 MMHG | HEART RATE: 86 BPM | OXYGEN SATURATION: 100 % | HEIGHT: 69 IN

## 2020-09-08 DIAGNOSIS — I10 ESSENTIAL HYPERTENSION: ICD-10-CM

## 2020-09-08 DIAGNOSIS — Z95.1 S/P CABG (CORONARY ARTERY BYPASS GRAFT): ICD-10-CM

## 2020-09-08 DIAGNOSIS — E78.00 HYPERCHOLESTEROLEMIA: Chronic | ICD-10-CM

## 2020-09-08 DIAGNOSIS — I25.10 CORONARY ARTERY DISEASE INVOLVING NATIVE CORONARY ARTERY OF NATIVE HEART WITHOUT ANGINA PECTORIS: ICD-10-CM

## 2020-09-08 DIAGNOSIS — E78.00 HYPERCHOLESTEREMIA: Primary | ICD-10-CM

## 2020-09-08 PROCEDURE — 3078F PR MOST RECENT DIASTOLIC BLOOD PRESSURE < 80 MM HG: ICD-10-PCS | Mod: CPTII,S$GLB,, | Performed by: INTERNAL MEDICINE

## 2020-09-08 PROCEDURE — 1126F PR PAIN SEVERITY QUANTIFIED, NO PAIN PRESENT: ICD-10-PCS | Mod: S$GLB,,, | Performed by: INTERNAL MEDICINE

## 2020-09-08 PROCEDURE — 3077F PR MOST RECENT SYSTOLIC BLOOD PRESSURE >= 140 MM HG: ICD-10-PCS | Mod: CPTII,S$GLB,, | Performed by: INTERNAL MEDICINE

## 2020-09-08 PROCEDURE — 99213 PR OFFICE/OUTPT VISIT, EST, LEVL III, 20-29 MIN: ICD-10-PCS | Mod: S$GLB,,, | Performed by: INTERNAL MEDICINE

## 2020-09-08 PROCEDURE — 3077F SYST BP >= 140 MM HG: CPT | Mod: CPTII,S$GLB,, | Performed by: INTERNAL MEDICINE

## 2020-09-08 PROCEDURE — 1101F PR PT FALLS ASSESS DOC 0-1 FALLS W/OUT INJ PAST YR: ICD-10-PCS | Mod: CPTII,S$GLB,, | Performed by: INTERNAL MEDICINE

## 2020-09-08 PROCEDURE — 1126F AMNT PAIN NOTED NONE PRSNT: CPT | Mod: S$GLB,,, | Performed by: INTERNAL MEDICINE

## 2020-09-08 PROCEDURE — 1159F PR MEDICATION LIST DOCUMENTED IN MEDICAL RECORD: ICD-10-PCS | Mod: S$GLB,,, | Performed by: INTERNAL MEDICINE

## 2020-09-08 PROCEDURE — 1101F PT FALLS ASSESS-DOCD LE1/YR: CPT | Mod: CPTII,S$GLB,, | Performed by: INTERNAL MEDICINE

## 2020-09-08 PROCEDURE — 1159F MED LIST DOCD IN RCRD: CPT | Mod: S$GLB,,, | Performed by: INTERNAL MEDICINE

## 2020-09-08 PROCEDURE — 3078F DIAST BP <80 MM HG: CPT | Mod: CPTII,S$GLB,, | Performed by: INTERNAL MEDICINE

## 2020-09-08 PROCEDURE — 99213 OFFICE O/P EST LOW 20 MIN: CPT | Mod: S$GLB,,, | Performed by: INTERNAL MEDICINE

## 2020-09-08 RX ORDER — METOPROLOL SUCCINATE 25 MG/1
12.5 TABLET, EXTENDED RELEASE ORAL DAILY
COMMUNITY
End: 2020-11-23

## 2020-09-08 RX ORDER — AMPICILLIN TRIHYDRATE 250 MG
1200 CAPSULE ORAL DAILY
COMMUNITY

## 2020-09-08 NOTE — PROGRESS NOTES
Patient ID:  Sofya Carmen is a 85 y.o. female who presents for follow-up of Hypertension, Coronary Artery Disease, and Cyst (on chest )      She was having a lot of problems with her blood pressure.  It usually occurred after she drank her coffee she started feeling fatigued tire.  She went to the emergency room went to her primary care physician.  She was treated for a potential urinary tract infection.  This 1 on for about 6 weeks then she decided that it was a coffee pot causing the problems.  She is cleaning the coffee pot and she feels much better.  She was leaving the water permanently in there.  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  It is probably secondary to a wire.  This is the 2nd time that area gets infected.      Past Medical History:   Diagnosis Date    Hearing loss, left     Hyperlipidemia     NSTEMI (non-ST elevated myocardial infarction) 12/2017    Palpitation     White coat hypertension         Past Surgical History:   Procedure Laterality Date    BUNIONECTOMY      CARDIAC CATHETERIZATION      CORONARY ARTERY BYPASS GRAFT      EYE SURGERY      cataract    HEMORRHOID SURGERY      SHOULDER SURGERY            Current Outpatient Medications   Medication Instructions    amLODIPine (NORVASC) 5 mg, Oral, 2 times daily    aspirin 81 mg, Oral, Daily    hydrALAZINE (APRESOLINE) 25 mg, Oral, Every 12 hours    hydroCHLOROthiazide (HYDRODIURIL) 25 mg, Oral, Twice weekly    metoprolol succinate (TOPROL-XL) 12.5 mg, Oral, Daily    nitrofurantoin, macrocrystal-monohydrate, (MACROBID) 100 MG capsule 100 mg, Oral, 2 times daily, For 5 days     nitroGLYCERIN (NITROSTAT) 0.4 mg, Sublingual, Every 5 min PRN    red yeast rice 1,200 mg, Oral, Daily        Review of patient's allergies indicates:   Allergen Reactions    Adhesive     [place] thyroid  [thyroid (pork)]      Other reaction(s): palpitations    Aspirin      Other reaction(s):  "bleeding    Latex     Lisinopril     Metoprolol      Other reaction(s): Rash    Nebivolol         Review of Systems   Constitution: Negative for chills and fever.   HENT: Negative for congestion and sore throat.    Cardiovascular: Negative.  Negative for chest pain and leg swelling.   Respiratory: Negative.  Negative for cough and shortness of breath.    Skin: Negative for itching and rash.   Musculoskeletal: Negative.  Negative for back pain and muscle cramps.   Gastrointestinal: Negative for abdominal pain and heartburn.   Neurological: Negative.  Negative for dizziness and weakness.   Psychiatric/Behavioral: Negative.  Negative for altered mental status and hallucinations.        Objective:     Vitals:    09/08/20 1231   BP: (!) 142/72   BP Location: Left arm   Patient Position: Sitting   BP Method: Small (Manual)   Pulse: 86   SpO2: 100%   Weight: 90.7 kg (200 lb)   Height: 5' 8.5" (1.74 m)       Physical Exam   Constitutional: She is oriented to person, place, and time.   Overweight female   HENT:   Head: Normocephalic and atraumatic.   Eyes: Conjunctivae and EOM are normal.   Cardiovascular: Normal rate, regular rhythm and normal heart sounds.   Pulmonary/Chest: Effort normal and breath sounds normal.   Abdominal: Soft. Bowel sounds are normal.   Musculoskeletal: Normal range of motion.   Neurological: She is alert and oriented to person, place, and time.   Skin: Skin is warm and dry.   Redness and mild pus like material coming from the wire at the coronary artery bypass site   Psychiatric: She has a normal mood and affect. Her behavior is normal. Judgment and thought content normal.   Nursing note and vitals reviewed.    CMP  Sodium   Date Value Ref Range Status   06/15/2020 141 136 - 145 mmol/L Final     Potassium   Date Value Ref Range Status   06/15/2020 4.1 3.5 - 5.1 mmol/L Final     Chloride   Date Value Ref Range Status   06/15/2020 108 95 - 110 mmol/L Final     CO2   Date Value Ref Range Status "   06/15/2020 25 23 - 29 mmol/L Final     Glucose   Date Value Ref Range Status   06/15/2020 107 70 - 110 mg/dL Final     BUN, Bld   Date Value Ref Range Status   06/15/2020 17 8 - 23 mg/dL Final     Creatinine   Date Value Ref Range Status   06/15/2020 0.8 0.5 - 1.4 mg/dL Final     Calcium   Date Value Ref Range Status   06/15/2020 8.5 (L) 8.7 - 10.5 mg/dL Final     Total Protein   Date Value Ref Range Status   06/14/2020 7.7 6.0 - 8.4 g/dL Final     Albumin   Date Value Ref Range Status   06/14/2020 4.2 3.5 - 5.2 g/dL Final     Total Bilirubin   Date Value Ref Range Status   06/14/2020 0.6 0.1 - 1.0 mg/dL Final     Comment:     For infants and newborns, interpretation of results should be based  on gestational age, weight and in agreement with clinical  observations.  Premature Infant recommended reference ranges:  Up to 24 hours.............<8.0 mg/dL  Up to 48 hours............<12.0 mg/dL  3-5 days..................<15.0 mg/dL  6-29 days.................<15.0 mg/dL       Alkaline Phosphatase   Date Value Ref Range Status   06/14/2020 52 (L) 55 - 135 U/L Final     AST   Date Value Ref Range Status   06/14/2020 17 10 - 40 U/L Final     ALT   Date Value Ref Range Status   06/14/2020 17 10 - 44 U/L Final     Anion Gap   Date Value Ref Range Status   06/15/2020 8 8 - 16 mmol/L Final     eGFR if    Date Value Ref Range Status   06/15/2020 >60.0 >60 mL/min/1.73 m^2 Final     eGFR if non    Date Value Ref Range Status   06/15/2020 >60.0 >60 mL/min/1.73 m^2 Final     Comment:     Calculation used to obtain the estimated glomerular filtration  rate (eGFR) is the CKD-EPI equation.         BMP  Lab Results   Component Value Date     06/15/2020    K 4.1 06/15/2020     06/15/2020    CO2 25 06/15/2020    BUN 17 06/15/2020    CREATININE 0.8 06/15/2020    CALCIUM 8.5 (L) 06/15/2020    ANIONGAP 8 06/15/2020    ESTGFRAFRICA >60.0 06/15/2020    EGFRNONAA >60.0 06/15/2020       BNP  @LABRCNTIP(BNP,BNPTRIAGEBLO)@   Lab Results   Component Value Date    CHOL 183 06/15/2020    CHOL 199 10/24/2019    CHOL 164 08/15/2019     Lab Results   Component Value Date    HDL 42 06/15/2020    HDL 56 10/24/2019    HDL 46 08/15/2019     Lab Results   Component Value Date    LDLCALC 116.4 06/15/2020    LDLCALC 126.6 10/24/2019    LDLCALC 104.8 08/15/2019     Lab Results   Component Value Date    TRIG 123 06/15/2020    TRIG 82 10/24/2019    TRIG 66 08/15/2019     Lab Results   Component Value Date    CHOLHDL 23.0 06/15/2020    CHOLHDL 28.1 10/24/2019    CHOLHDL 28.0 08/15/2019      Lab Results   Component Value Date    WBC 8.66 06/15/2020    HGB 13.4 06/15/2020    HCT 41.3 06/15/2020    MCV 91 06/15/2020     06/15/2020           Results for orders placed during the hospital encounter of 06/14/20   Echo Color Flow Doppler? Yes    Narrative · Concentric left ventricular remodeling.  · Hyperdynamic left ventricular systolic function. The estimated ejection   fraction is 75%.  · No wall motion abnormalities.  · Normal LV diastolic function.  · Normal right ventricular systolic function.  · No pulmonary hypertension present.  · Normal central venous pressure (3 mmHg).  · The estimated PA systolic pressure is 24 mmHg.         No results found for this or any previous visit.       Assessment:       No problem-specific Assessment & Plan notes found for this encounter.       Plan:           go see the cardiovascular surgeon  Diet exercise.  Return to the office in 6 month with a CBC lipid as well as a CMP.

## 2020-09-08 NOTE — ASSESSMENT & PLAN NOTE
She appears to have infection at the wire site.  She has to go see  for evaluation and potential fixing the wire.

## 2020-09-10 ENCOUNTER — TELEPHONE (OUTPATIENT)
Dept: VASCULAR SURGERY | Facility: CLINIC | Age: 85
End: 2020-09-10

## 2020-09-10 NOTE — TELEPHONE ENCOUNTER
----- Message from Francisca Wray sent at 9/10/2020 12:06 PM CDT -----  Regarding: Appointment Access  Contact: Patient  Type: Needs Medical Advice    Who Called:  Patient    Symptoms (please be specific):  cyst growing on scar between breasts. Very red    Best Call Back Number: [phone]    Additional Information:   Pt says this has happened before and had to go to ER. He told her if this ever happened again, to call him b/c something may need to be done with the wiring. Patient does not want to have to go to the ER again so requesting an appt to be seen

## 2020-09-10 NOTE — TELEPHONE ENCOUNTER
Pt scheduled for appt on 9/16 in [place]  For eval by Dr. Arnett. Bactrim DS #20 to be taken twice a day called to [facility] [phone] per Dr. Arnett. To call if sx worsen before then.

## 2020-09-18 NOTE — TELEPHONE ENCOUNTER
----- Message from Sammie Cifuentes sent at 9/18/2020  3:36 PM CDT -----  Patient called in stating she went to her pharmacy to get a refill on her Hydralazine and was told that her 90 day prescription was picked up back in July. Patient states she did not  the prescription is asking for an emergent prescription to be sent in. Patient asked to be called back at [phone].

## 2020-09-22 ENCOUNTER — TELEPHONE (OUTPATIENT)
Dept: CARDIOLOGY | Facility: CLINIC | Age: 85
End: 2020-09-22

## 2020-09-22 RX ORDER — HYDRALAZINE HYDROCHLORIDE 25 MG/1
12.5 TABLET, FILM COATED ORAL DAILY
Qty: 15 TABLET | Refills: 11 | Status: SHIPPED | OUTPATIENT
Start: 2020-09-22 | End: 2021-01-12

## 2020-09-22 NOTE — TELEPHONE ENCOUNTER
----- Message from Sabina Hurley MA sent at 9/22/2020  9:36 AM CDT -----  Patient called and said she called Friday and needs hydralazine sent to pharmacy. Pharmacy said she cannot have filled due to having it filled in July. Patient stated she never had it filled in July. Patient needs callback.

## 2020-09-22 NOTE — TELEPHONE ENCOUNTER
Patient called and said she called Friday and needs hydralazine sent to pharmacy. Pharmacy said she cannot have filled due to having it filled in July. Patient stated she never had it filled in July. Patient needs callback.     Informed patient that I spoke with Dania. I told patient to check with pharmacy and patient said she did that already and she just want take her meds if we cannot get it straightened out because shes out as of today but [facility] want give her the rx.

## 2020-09-22 NOTE — TELEPHONE ENCOUNTER
S/w pharmacy & they said she did pickup a 90 day supply on 7/14/20. She can  her new script on 9/24/20.     S/w pt & advised.

## 2020-09-23 ENCOUNTER — OFFICE VISIT (OUTPATIENT)
Dept: VASCULAR SURGERY | Facility: CLINIC | Age: 85
End: 2020-09-23
Payer: MEDICARE

## 2020-09-23 VITALS
DIASTOLIC BLOOD PRESSURE: 77 MMHG | SYSTOLIC BLOOD PRESSURE: 200 MMHG | HEART RATE: 97 BPM | BODY MASS INDEX: 29.97 KG/M2 | HEIGHT: 69 IN

## 2020-09-23 DIAGNOSIS — L02.92 FURUNCLE: Primary | ICD-10-CM

## 2020-09-23 PROCEDURE — 3077F SYST BP >= 140 MM HG: CPT | Mod: CPTII,S$GLB,, | Performed by: THORACIC SURGERY (CARDIOTHORACIC VASCULAR SURGERY)

## 2020-09-23 PROCEDURE — 99999 PR PBB SHADOW E&M-EST. PATIENT-LVL III: CPT | Mod: PBBFAC,,, | Performed by: THORACIC SURGERY (CARDIOTHORACIC VASCULAR SURGERY)

## 2020-09-23 PROCEDURE — 1159F MED LIST DOCD IN RCRD: CPT | Mod: S$GLB,,, | Performed by: THORACIC SURGERY (CARDIOTHORACIC VASCULAR SURGERY)

## 2020-09-23 PROCEDURE — 3077F PR MOST RECENT SYSTOLIC BLOOD PRESSURE >= 140 MM HG: ICD-10-PCS | Mod: CPTII,S$GLB,, | Performed by: THORACIC SURGERY (CARDIOTHORACIC VASCULAR SURGERY)

## 2020-09-23 PROCEDURE — 1159F PR MEDICATION LIST DOCUMENTED IN MEDICAL RECORD: ICD-10-PCS | Mod: S$GLB,,, | Performed by: THORACIC SURGERY (CARDIOTHORACIC VASCULAR SURGERY)

## 2020-09-23 PROCEDURE — 99213 PR OFFICE/OUTPT VISIT, EST, LEVL III, 20-29 MIN: ICD-10-PCS | Mod: S$GLB,,, | Performed by: THORACIC SURGERY (CARDIOTHORACIC VASCULAR SURGERY)

## 2020-09-23 PROCEDURE — 3078F DIAST BP <80 MM HG: CPT | Mod: CPTII,S$GLB,, | Performed by: THORACIC SURGERY (CARDIOTHORACIC VASCULAR SURGERY)

## 2020-09-23 PROCEDURE — 99999 PR PBB SHADOW E&M-EST. PATIENT-LVL III: ICD-10-PCS | Mod: PBBFAC,,, | Performed by: THORACIC SURGERY (CARDIOTHORACIC VASCULAR SURGERY)

## 2020-09-23 PROCEDURE — 1126F AMNT PAIN NOTED NONE PRSNT: CPT | Mod: S$GLB,,, | Performed by: THORACIC SURGERY (CARDIOTHORACIC VASCULAR SURGERY)

## 2020-09-23 PROCEDURE — 99213 OFFICE O/P EST LOW 20 MIN: CPT | Mod: S$GLB,,, | Performed by: THORACIC SURGERY (CARDIOTHORACIC VASCULAR SURGERY)

## 2020-09-23 PROCEDURE — 1101F PR PT FALLS ASSESS DOC 0-1 FALLS W/OUT INJ PAST YR: ICD-10-PCS | Mod: CPTII,S$GLB,, | Performed by: THORACIC SURGERY (CARDIOTHORACIC VASCULAR SURGERY)

## 2020-09-23 PROCEDURE — 1126F PR PAIN SEVERITY QUANTIFIED, NO PAIN PRESENT: ICD-10-PCS | Mod: S$GLB,,, | Performed by: THORACIC SURGERY (CARDIOTHORACIC VASCULAR SURGERY)

## 2020-09-23 PROCEDURE — 1101F PT FALLS ASSESS-DOCD LE1/YR: CPT | Mod: CPTII,S$GLB,, | Performed by: THORACIC SURGERY (CARDIOTHORACIC VASCULAR SURGERY)

## 2020-09-23 PROCEDURE — 3078F PR MOST RECENT DIASTOLIC BLOOD PRESSURE < 80 MM HG: ICD-10-PCS | Mod: CPTII,S$GLB,, | Performed by: THORACIC SURGERY (CARDIOTHORACIC VASCULAR SURGERY)

## 2020-09-23 RX ORDER — SULFAMETHOXAZOLE AND TRIMETHOPRIM 800; 160 MG/1; MG/1
TABLET ORAL
COMMUNITY
Start: 2020-09-18 | End: 2020-11-23

## 2020-09-23 NOTE — PROGRESS NOTES
This patient has had previous CABG approximately 2 and half years ago.  She comes back to the office today in follow up with an area along the course of her sternum that has caused pain and a localized swelling and purulent drainage over the last 5-7 days.  This has improved and now she has very little drainage.  She comes back to the office today to address her concern regarding this.  Her medicines are noted and are part epic record.  Problem list was reviewed.  She has no other pertinent family social history at this time.  She is not a smoker.  Exam vital signs are stable.  The sternotomy incision was examined very closely.  The incision itself is well approximated and has healed but along the mid aspect adjacent to the incision she has an area that appears to be consistent with a furuncle that has drained purulent material.  This has resolved.  At this point I would recommend finishing the course of Bactrim.  I explained to the patient that if the problem flares again she may need the area excised.  She seems to be understanding but I do not think at this time there is any sternal involvement and it does not appear even that the tissue within the previous incision is involved.

## 2020-11-23 ENCOUNTER — OFFICE VISIT (OUTPATIENT)
Dept: FAMILY MEDICINE | Facility: CLINIC | Age: 85
End: 2020-11-23
Payer: MEDICARE

## 2020-11-23 VITALS
DIASTOLIC BLOOD PRESSURE: 70 MMHG | HEART RATE: 75 BPM | BODY MASS INDEX: 29.3 KG/M2 | SYSTOLIC BLOOD PRESSURE: 137 MMHG | HEIGHT: 69 IN | OXYGEN SATURATION: 97 % | WEIGHT: 197.81 LBS | TEMPERATURE: 98 F | RESPIRATION RATE: 16 BRPM

## 2020-11-23 DIAGNOSIS — I10 WHITE COAT SYNDROME WITH DIAGNOSIS OF HYPERTENSION: ICD-10-CM

## 2020-11-23 DIAGNOSIS — I10 ESSENTIAL HYPERTENSION: Primary | ICD-10-CM

## 2020-11-23 PROCEDURE — 3075F SYST BP GE 130 - 139MM HG: CPT | Mod: CPTII,S$GLB,, | Performed by: FAMILY MEDICINE

## 2020-11-23 PROCEDURE — 99213 OFFICE O/P EST LOW 20 MIN: CPT | Mod: S$GLB,,, | Performed by: FAMILY MEDICINE

## 2020-11-23 PROCEDURE — 3075F PR MOST RECENT SYSTOLIC BLOOD PRESS GE 130-139MM HG: ICD-10-PCS | Mod: CPTII,S$GLB,, | Performed by: FAMILY MEDICINE

## 2020-11-23 PROCEDURE — 99999 PR PBB SHADOW E&M-EST. PATIENT-LVL IV: CPT | Mod: PBBFAC,,, | Performed by: FAMILY MEDICINE

## 2020-11-23 PROCEDURE — 99213 PR OFFICE/OUTPT VISIT, EST, LEVL III, 20-29 MIN: ICD-10-PCS | Mod: S$GLB,,, | Performed by: FAMILY MEDICINE

## 2020-11-23 PROCEDURE — 1159F PR MEDICATION LIST DOCUMENTED IN MEDICAL RECORD: ICD-10-PCS | Mod: S$GLB,,, | Performed by: FAMILY MEDICINE

## 2020-11-23 PROCEDURE — 3078F DIAST BP <80 MM HG: CPT | Mod: CPTII,S$GLB,, | Performed by: FAMILY MEDICINE

## 2020-11-23 PROCEDURE — 3078F PR MOST RECENT DIASTOLIC BLOOD PRESSURE < 80 MM HG: ICD-10-PCS | Mod: CPTII,S$GLB,, | Performed by: FAMILY MEDICINE

## 2020-11-23 PROCEDURE — 99999 PR PBB SHADOW E&M-EST. PATIENT-LVL IV: ICD-10-PCS | Mod: PBBFAC,,, | Performed by: FAMILY MEDICINE

## 2020-11-23 PROCEDURE — 1159F MED LIST DOCD IN RCRD: CPT | Mod: S$GLB,,, | Performed by: FAMILY MEDICINE

## 2020-11-23 NOTE — PROGRESS NOTES
"Subjective:       Patient ID: Sofya Carmen is a 85 y.o. female.    Chief Complaint: Follow-up and Hypertension    In regards to the patient's hypertension, patient denies chest pain/sob, and reports compliance with medication regimen.    Review of Systems   Constitutional: Negative for activity change, appetite change, chills, fatigue and fever.   HENT: Negative for congestion, dental problem, facial swelling, nosebleeds, postnasal drip, sinus pain, sore throat, trouble swallowing and voice change.    Eyes: Negative for pain, discharge and visual disturbance.   Respiratory: Negative for apnea, cough, chest tightness and shortness of breath.    Cardiovascular: Negative for chest pain and palpitations.   Gastrointestinal: Negative for abdominal pain, blood in stool, constipation and nausea.   Endocrine: Negative for cold intolerance, polydipsia and polyuria.   Genitourinary: Negative for difficulty urinating, enuresis and flank pain.   Musculoskeletal: Negative for arthralgias and back pain.   Skin: Negative for color change.   Allergic/Immunologic: Negative for environmental allergies and immunocompromised state.   Neurological: Negative for dizziness and light-headedness.   Hematological: Negative for adenopathy.   Psychiatric/Behavioral: Negative for agitation, behavioral problems, decreased concentration and dysphoric mood. The patient is not nervous/anxious.    All other systems reviewed and are negative.        Reviewed family, medical, surgical, and social history.    Objective:      /70 Comment: home reading  Pulse 75   Temp 97.6 °F (36.4 °C) (Oral)   Resp 16   Ht 5' 8.5" (1.74 m)   Wt 89.7 kg (197 lb 12.8 oz)   SpO2 97%   BMI 29.64 kg/m²   Physical Exam  Vitals signs and nursing note reviewed.   Constitutional:       General: She is not in acute distress.     Appearance: She is well-developed. She is not diaphoretic.   HENT:      Head: Normocephalic and atraumatic.      Nose: Nose normal.      " Mouth/Throat:      Pharynx: No oropharyngeal exudate.   Eyes:      General: No scleral icterus.     Conjunctiva/sclera: Conjunctivae normal.      Pupils: Pupils are equal, round, and reactive to light.   Neck:      Musculoskeletal: Normal range of motion and neck supple.      Thyroid: No thyromegaly.   Cardiovascular:      Rate and Rhythm: Normal rate and regular rhythm.      Heart sounds: Normal heart sounds. No murmur. No friction rub. No gallop.    Pulmonary:      Effort: Pulmonary effort is normal. No respiratory distress.      Breath sounds: Normal breath sounds. No wheezing or rales.   Chest:      Chest wall: No tenderness.   Abdominal:      General: Bowel sounds are normal. There is no distension.      Palpations: Abdomen is soft.      Tenderness: There is no abdominal tenderness. There is no guarding.   Musculoskeletal: Normal range of motion.         General: No tenderness or deformity.   Lymphadenopathy:      Cervical: No cervical adenopathy.   Skin:     General: Skin is warm and dry.      Coloration: Skin is not pale.      Findings: No erythema or rash.   Neurological:      Mental Status: She is alert and oriented to person, place, and time.      Cranial Nerves: No cranial nerve deficit.      Sensory: No sensory deficit.      Motor: No abnormal muscle tone.      Deep Tendon Reflexes: Reflexes normal.   Psychiatric:         Behavior: Behavior normal.         Thought Content: Thought content normal.         Judgment: Judgment normal.         Assessment:       1. Essential hypertension    2. White coat syndrome with diagnosis of hypertension        Plan:       Essential hypertension    White coat syndrome with diagnosis of hypertension            Risks, benefits, and side effects were discussed with the patient. All questions were answered to the fullest satisfaction of the patient, and pt verbalized understanding and agreement to treatment plan. Pt was to call with any new or worsening symptoms, or present  to the ER.

## 2021-01-07 RX ORDER — AMLODIPINE BESYLATE 10 MG/1
5 TABLET ORAL 2 TIMES DAILY
Qty: 60 TABLET | Refills: 11 | Status: SHIPPED | OUTPATIENT
Start: 2021-01-07 | End: 2021-01-11 | Stop reason: SDUPTHER

## 2021-01-11 ENCOUNTER — TELEPHONE (OUTPATIENT)
Dept: FAMILY MEDICINE | Facility: CLINIC | Age: 86
End: 2021-01-11

## 2021-01-11 RX ORDER — AMLODIPINE BESYLATE 10 MG/1
5 TABLET ORAL 2 TIMES DAILY
Qty: 60 TABLET | Refills: 11 | Status: SHIPPED | OUTPATIENT
Start: 2021-01-11 | End: 2021-01-12

## 2021-01-12 RX ORDER — HYDRALAZINE HYDROCHLORIDE 25 MG/1
TABLET, FILM COATED ORAL
Qty: 45 TABLET | Refills: 11 | Status: SHIPPED | OUTPATIENT
Start: 2021-01-12

## 2021-01-12 RX ORDER — HYDROCHLOROTHIAZIDE 25 MG/1
TABLET ORAL
Qty: 25 TABLET | Refills: 11 | Status: SHIPPED | OUTPATIENT
Start: 2021-01-12

## 2021-01-12 RX ORDER — AMLODIPINE BESYLATE 10 MG/1
TABLET ORAL
Qty: 180 TABLET | Refills: 11 | Status: SHIPPED | OUTPATIENT
Start: 2021-01-12 | End: 2021-01-12

## 2021-02-26 ENCOUNTER — LAB VISIT (OUTPATIENT)
Dept: LAB | Facility: HOSPITAL | Age: 86
End: 2021-02-26
Attending: INTERNAL MEDICINE
Payer: MEDICARE

## 2021-02-26 DIAGNOSIS — E78.00 HYPERCHOLESTEREMIA: ICD-10-CM

## 2021-02-26 LAB
ALBUMIN SERPL BCP-MCNC: 3.9 G/DL (ref 3.5–5.2)
ALP SERPL-CCNC: 46 U/L (ref 55–135)
ALT SERPL W/O P-5'-P-CCNC: 13 U/L (ref 10–44)
ANION GAP SERPL CALC-SCNC: 8 MMOL/L (ref 8–16)
AST SERPL-CCNC: 17 U/L (ref 10–40)
BASOPHILS # BLD AUTO: 0.06 K/UL (ref 0–0.2)
BASOPHILS NFR BLD: 0.8 % (ref 0–1.9)
BILIRUB SERPL-MCNC: 0.6 MG/DL (ref 0.1–1)
BUN SERPL-MCNC: 15 MG/DL (ref 8–23)
CALCIUM SERPL-MCNC: 9.3 MG/DL (ref 8.7–10.5)
CHLORIDE SERPL-SCNC: 106 MMOL/L (ref 95–110)
CHOLEST SERPL-MCNC: 182 MG/DL (ref 120–199)
CHOLEST/HDLC SERPL: 3.1 {RATIO} (ref 2–5)
CO2 SERPL-SCNC: 30 MMOL/L (ref 23–29)
CREAT SERPL-MCNC: 1 MG/DL (ref 0.5–1.4)
DIFFERENTIAL METHOD: ABNORMAL
EOSINOPHIL # BLD AUTO: 0.2 K/UL (ref 0–0.5)
EOSINOPHIL NFR BLD: 3.2 % (ref 0–8)
ERYTHROCYTE [DISTWIDTH] IN BLOOD BY AUTOMATED COUNT: 14.7 % (ref 11.5–14.5)
EST. GFR  (AFRICAN AMERICAN): >60 ML/MIN/1.73 M^2
EST. GFR  (NON AFRICAN AMERICAN): 52.2 ML/MIN/1.73 M^2
GLUCOSE SERPL-MCNC: 111 MG/DL (ref 70–110)
HCT VFR BLD AUTO: 40.8 % (ref 37–48.5)
HDLC SERPL-MCNC: 58 MG/DL (ref 40–75)
HDLC SERPL: 31.9 % (ref 20–50)
HGB BLD-MCNC: 12.8 G/DL (ref 12–16)
IMM GRANULOCYTES # BLD AUTO: 0.02 K/UL (ref 0–0.04)
IMM GRANULOCYTES NFR BLD AUTO: 0.3 % (ref 0–0.5)
LDLC SERPL CALC-MCNC: 110.4 MG/DL (ref 63–159)
LYMPHOCYTES # BLD AUTO: 2.2 K/UL (ref 1–4.8)
LYMPHOCYTES NFR BLD: 31.1 % (ref 18–48)
MCH RBC QN AUTO: 28.3 PG (ref 27–31)
MCHC RBC AUTO-ENTMCNC: 31.4 G/DL (ref 32–36)
MCV RBC AUTO: 90 FL (ref 82–98)
MONOCYTES # BLD AUTO: 0.6 K/UL (ref 0.3–1)
MONOCYTES NFR BLD: 8.9 % (ref 4–15)
NEUTROPHILS # BLD AUTO: 4 K/UL (ref 1.8–7.7)
NEUTROPHILS NFR BLD: 55.7 % (ref 38–73)
NONHDLC SERPL-MCNC: 124 MG/DL
NRBC BLD-RTO: 0 /100 WBC
PLATELET # BLD AUTO: 280 K/UL (ref 150–350)
PMV BLD AUTO: 10.8 FL (ref 9.2–12.9)
POTASSIUM SERPL-SCNC: 3.8 MMOL/L (ref 3.5–5.1)
PROT SERPL-MCNC: 6.7 G/DL (ref 6–8.4)
RBC # BLD AUTO: 4.52 M/UL (ref 4–5.4)
SODIUM SERPL-SCNC: 144 MMOL/L (ref 136–145)
TRIGL SERPL-MCNC: 68 MG/DL (ref 30–150)
WBC # BLD AUTO: 7.11 K/UL (ref 3.9–12.7)

## 2021-02-26 PROCEDURE — 80053 COMPREHEN METABOLIC PANEL: CPT

## 2021-02-26 PROCEDURE — 85025 COMPLETE CBC W/AUTO DIFF WBC: CPT

## 2021-02-26 PROCEDURE — 36415 COLL VENOUS BLD VENIPUNCTURE: CPT

## 2021-02-26 PROCEDURE — 80061 LIPID PANEL: CPT

## 2021-03-09 ENCOUNTER — OFFICE VISIT (OUTPATIENT)
Dept: CARDIOLOGY | Facility: CLINIC | Age: 86
End: 2021-03-09
Payer: MEDICARE

## 2021-03-09 VITALS
DIASTOLIC BLOOD PRESSURE: 70 MMHG | SYSTOLIC BLOOD PRESSURE: 160 MMHG | WEIGHT: 196 LBS | BODY MASS INDEX: 29.03 KG/M2 | HEIGHT: 69 IN | HEART RATE: 75 BPM | OXYGEN SATURATION: 97 %

## 2021-03-09 DIAGNOSIS — E89.0 POSTABLATIVE HYPOTHYROIDISM: Primary | ICD-10-CM

## 2021-03-09 DIAGNOSIS — E78.00 HYPERCHOLESTEROLEMIA: Chronic | ICD-10-CM

## 2021-03-09 DIAGNOSIS — I10 ESSENTIAL HYPERTENSION: ICD-10-CM

## 2021-03-09 PROCEDURE — 3077F SYST BP >= 140 MM HG: CPT | Mod: CPTII,S$GLB,, | Performed by: INTERNAL MEDICINE

## 2021-03-09 PROCEDURE — 1159F PR MEDICATION LIST DOCUMENTED IN MEDICAL RECORD: ICD-10-PCS | Mod: S$GLB,,, | Performed by: INTERNAL MEDICINE

## 2021-03-09 PROCEDURE — 99214 PR OFFICE/OUTPT VISIT, EST, LEVL IV, 30-39 MIN: ICD-10-PCS | Mod: S$GLB,,, | Performed by: INTERNAL MEDICINE

## 2021-03-09 PROCEDURE — 1101F PT FALLS ASSESS-DOCD LE1/YR: CPT | Mod: CPTII,S$GLB,, | Performed by: INTERNAL MEDICINE

## 2021-03-09 PROCEDURE — 3078F DIAST BP <80 MM HG: CPT | Mod: CPTII,S$GLB,, | Performed by: INTERNAL MEDICINE

## 2021-03-09 PROCEDURE — 99214 OFFICE O/P EST MOD 30 MIN: CPT | Mod: S$GLB,,, | Performed by: INTERNAL MEDICINE

## 2021-03-09 PROCEDURE — 1159F MED LIST DOCD IN RCRD: CPT | Mod: S$GLB,,, | Performed by: INTERNAL MEDICINE

## 2021-03-09 PROCEDURE — 3078F PR MOST RECENT DIASTOLIC BLOOD PRESSURE < 80 MM HG: ICD-10-PCS | Mod: CPTII,S$GLB,, | Performed by: INTERNAL MEDICINE

## 2021-03-09 PROCEDURE — 1101F PR PT FALLS ASSESS DOC 0-1 FALLS W/OUT INJ PAST YR: ICD-10-PCS | Mod: CPTII,S$GLB,, | Performed by: INTERNAL MEDICINE

## 2021-03-09 PROCEDURE — 3288F PR FALLS RISK ASSESSMENT DOCUMENTED: ICD-10-PCS | Mod: CPTII,S$GLB,, | Performed by: INTERNAL MEDICINE

## 2021-03-09 PROCEDURE — 3288F FALL RISK ASSESSMENT DOCD: CPT | Mod: CPTII,S$GLB,, | Performed by: INTERNAL MEDICINE

## 2021-03-09 PROCEDURE — 3077F PR MOST RECENT SYSTOLIC BLOOD PRESSURE >= 140 MM HG: ICD-10-PCS | Mod: CPTII,S$GLB,, | Performed by: INTERNAL MEDICINE

## 2021-03-09 RX ORDER — GARLIC 100 MG
TABLET ORAL
COMMUNITY

## 2021-06-13 ENCOUNTER — HOSPITAL ENCOUNTER (EMERGENCY)
Facility: HOSPITAL | Age: 86
Discharge: HOME | End: 2021-06-13
Attending: EMERGENCY MEDICINE
Payer: MEDICARE

## 2021-06-13 VITALS
SYSTOLIC BLOOD PRESSURE: 160 MMHG | RESPIRATION RATE: 18 BRPM | DIASTOLIC BLOOD PRESSURE: 66 MMHG | OXYGEN SATURATION: 96 % | HEIGHT: 68 IN | WEIGHT: 200 LBS | HEART RATE: 78 BPM | TEMPERATURE: 99 F | BODY MASS INDEX: 30.31 KG/M2

## 2021-06-13 DIAGNOSIS — R10.32 LEFT LOWER QUADRANT ABDOMINAL PAIN: Primary | ICD-10-CM

## 2021-06-13 DIAGNOSIS — K57.90 DIVERTICULOSIS: ICD-10-CM

## 2021-06-13 LAB
ALBUMIN SERPL BCP-MCNC: 3.7 G/DL (ref 3.5–5.2)
ALP SERPL-CCNC: 67 U/L (ref 55–135)
ALT SERPL W/O P-5'-P-CCNC: 19 U/L (ref 10–44)
ANION GAP SERPL CALC-SCNC: 11 MMOL/L (ref 8–16)
AST SERPL-CCNC: 15 U/L (ref 10–40)
BASOPHILS # BLD AUTO: 0.06 K/UL (ref 0–0.2)
BASOPHILS NFR BLD: 0.7 % (ref 0–1.9)
BILIRUB SERPL-MCNC: 0.3 MG/DL (ref 0.1–1)
BILIRUB UR QL STRIP: NEGATIVE
BUN SERPL-MCNC: 15 MG/DL (ref 8–23)
CALCIUM SERPL-MCNC: 9 MG/DL (ref 8.7–10.5)
CHLORIDE SERPL-SCNC: 108 MMOL/L (ref 95–110)
CLARITY UR: CLEAR
CO2 SERPL-SCNC: 23 MMOL/L (ref 23–29)
COLOR UR: YELLOW
CREAT SERPL-MCNC: 0.7 MG/DL (ref 0.5–1.4)
DIFFERENTIAL METHOD: ABNORMAL
EOSINOPHIL # BLD AUTO: 0.3 K/UL (ref 0–0.5)
EOSINOPHIL NFR BLD: 2.9 % (ref 0–8)
ERYTHROCYTE [DISTWIDTH] IN BLOOD BY AUTOMATED COUNT: 14.9 % (ref 11.5–14.5)
EST. GFR  (AFRICAN AMERICAN): >60 ML/MIN/1.73 M^2
EST. GFR  (NON AFRICAN AMERICAN): >60 ML/MIN/1.73 M^2
GLUCOSE SERPL-MCNC: 104 MG/DL (ref 70–110)
GLUCOSE UR QL STRIP: NEGATIVE
HCT VFR BLD AUTO: 40.3 % (ref 37–48.5)
HGB BLD-MCNC: 13.3 G/DL (ref 12–16)
HGB UR QL STRIP: NEGATIVE
IMM GRANULOCYTES # BLD AUTO: 0.04 K/UL (ref 0–0.04)
IMM GRANULOCYTES NFR BLD AUTO: 0.5 % (ref 0–0.5)
KETONES UR QL STRIP: NEGATIVE
LEUKOCYTE ESTERASE UR QL STRIP: NEGATIVE
LYMPHOCYTES # BLD AUTO: 2.3 K/UL (ref 1–4.8)
LYMPHOCYTES NFR BLD: 26 % (ref 18–48)
MCH RBC QN AUTO: 28.7 PG (ref 27–31)
MCHC RBC AUTO-ENTMCNC: 33 G/DL (ref 32–36)
MCV RBC AUTO: 87 FL (ref 82–98)
MONOCYTES # BLD AUTO: 0.7 K/UL (ref 0.3–1)
MONOCYTES NFR BLD: 7.7 % (ref 4–15)
NEUTROPHILS # BLD AUTO: 5.5 K/UL (ref 1.8–7.7)
NEUTROPHILS NFR BLD: 62.2 % (ref 38–73)
NITRITE UR QL STRIP: NEGATIVE
NRBC BLD-RTO: 0 /100 WBC
PH UR STRIP: 7 [PH] (ref 5–8)
PLATELET # BLD AUTO: 236 K/UL (ref 150–450)
PMV BLD AUTO: 10.6 FL (ref 9.2–12.9)
POTASSIUM SERPL-SCNC: 4 MMOL/L (ref 3.5–5.1)
PROT SERPL-MCNC: 6.7 G/DL (ref 6–8.4)
PROT UR QL STRIP: NEGATIVE
RBC # BLD AUTO: 4.63 M/UL (ref 4–5.4)
SODIUM SERPL-SCNC: 142 MMOL/L (ref 136–145)
SP GR UR STRIP: 1.01 (ref 1–1.03)
URN SPEC COLLECT METH UR: NORMAL
UROBILINOGEN UR STRIP-ACNC: NEGATIVE EU/DL
WBC # BLD AUTO: 8.74 K/UL (ref 3.9–12.7)

## 2021-06-13 PROCEDURE — 74176 CT ABD & PELVIS W/O CONTRAST: CPT | Mod: TC

## 2021-06-13 PROCEDURE — 80053 COMPREHEN METABOLIC PANEL: CPT | Performed by: NURSE PRACTITIONER

## 2021-06-13 PROCEDURE — 99284 EMERGENCY DEPT VISIT MOD MDM: CPT | Mod: 25

## 2021-06-13 PROCEDURE — 81003 URINALYSIS AUTO W/O SCOPE: CPT | Performed by: EMERGENCY MEDICINE

## 2021-06-13 PROCEDURE — 74176 CT ABDOMEN PELVIS WITHOUT CONTRAST: ICD-10-PCS | Mod: 26,,, | Performed by: RADIOLOGY

## 2021-06-13 PROCEDURE — 85025 COMPLETE CBC W/AUTO DIFF WBC: CPT | Performed by: NURSE PRACTITIONER

## 2021-06-13 PROCEDURE — 74176 CT ABD & PELVIS W/O CONTRAST: CPT | Mod: 26,,, | Performed by: RADIOLOGY

## 2021-06-13 PROCEDURE — 25000003 PHARM REV CODE 250: Performed by: NURSE PRACTITIONER

## 2021-06-13 RX ORDER — METRONIDAZOLE 250 MG/1
500 TABLET ORAL
Status: COMPLETED | OUTPATIENT
Start: 2021-06-13 | End: 2021-06-13

## 2021-06-13 RX ORDER — CIPROFLOXACIN 500 MG/1
500 TABLET ORAL 2 TIMES DAILY
Qty: 20 TABLET | Refills: 0 | Status: SHIPPED | OUTPATIENT
Start: 2021-06-13 | End: 2021-06-23

## 2021-06-13 RX ORDER — CIPROFLOXACIN 250 MG/1
500 TABLET, FILM COATED ORAL
Status: COMPLETED | OUTPATIENT
Start: 2021-06-13 | End: 2021-06-13

## 2021-06-13 RX ORDER — METRONIDAZOLE 500 MG/1
500 TABLET ORAL EVERY 12 HOURS
Qty: 14 TABLET | Refills: 0 | Status: SHIPPED | OUTPATIENT
Start: 2021-06-13 | End: 2021-06-20

## 2021-06-13 RX ADMIN — METRONIDAZOLE 500 MG: 250 TABLET ORAL at 07:06

## 2021-06-13 RX ADMIN — CIPROFLOXACIN HYDROCHLORIDE 500 MG: 250 TABLET, FILM COATED ORAL at 07:06

## 2021-10-19 NOTE — PROGRESS NOTES
@   07:45   PATIENT INJECTED WTIH  10.9mCi Tc99M MYOVIEW IV FOR RESTING IMAGES.   M.H.    REMAIN NPO STATUS.                        @ 08:15 OBTAINED RESTING IMAGES.    REMAIN NPO STATUS.                          HANY CESAR    
@  09:45OBTAINED STRESS IMAGES.    @  10:30 NUCLEAR MEDICINE MYOPERFUSION STUDY COMPLETED.    HANY CESAR    
@ 08:59  PATIENT INJECTED WITH 27.2mCi Tc99m MYOVIEW FOR STRESS IMAGES.    LEXISCAN STRESS    RELEASE NPO STATUS FROM NUCLEAR MEDICINE.                   HANY CESAR    
negative

## 2021-11-18 ENCOUNTER — DOCUMENTATION ONLY (OUTPATIENT)
Dept: AUDIOLOGY | Facility: CLINIC | Age: 86
End: 2021-11-18
Payer: MEDICARE

## 2022-03-30 ENCOUNTER — TELEPHONE (OUTPATIENT)
Dept: FAMILY MEDICINE | Facility: CLINIC | Age: 87
End: 2022-03-30
Payer: MEDICARE

## 2022-04-19 ENCOUNTER — APPOINTMENT (RX ONLY)
Dept: URBAN - METROPOLITAN AREA CLINIC 54 | Facility: CLINIC | Age: 87
Setting detail: DERMATOLOGY
End: 2022-04-19

## 2022-04-19 DIAGNOSIS — L57.0 ACTINIC KERATOSIS: ICD-10-CM | Status: INADEQUATELY CONTROLLED

## 2022-04-19 DIAGNOSIS — Z85.828 PERSONAL HISTORY OF OTHER MALIGNANT NEOPLASM OF SKIN: ICD-10-CM

## 2022-04-19 DIAGNOSIS — L85.3 XEROSIS CUTIS: ICD-10-CM

## 2022-04-19 DIAGNOSIS — L57.8 OTHER SKIN CHANGES DUE TO CHRONIC EXPOSURE TO NONIONIZING RADIATION: ICD-10-CM | Status: INADEQUATELY CONTROLLED

## 2022-04-19 PROCEDURE — 17000 DESTRUCT PREMALG LESION: CPT

## 2022-04-19 PROCEDURE — ? SUNSCREEN RECOMMENDATIONS

## 2022-04-19 PROCEDURE — ? COUNSELING

## 2022-04-19 PROCEDURE — ? PRESCRIPTION MEDICATION MANAGEMENT

## 2022-04-19 PROCEDURE — ? ADDITIONAL NOTES

## 2022-04-19 PROCEDURE — 99204 OFFICE O/P NEW MOD 45 MIN: CPT | Mod: 25

## 2022-04-19 PROCEDURE — ? LIQUID NITROGEN

## 2022-04-19 PROCEDURE — ? PRESCRIPTION

## 2022-04-19 PROCEDURE — ? FOLLOW UP FOR NEXT VISIT

## 2022-04-19 RX ORDER — TRIAMCINOLONE ACETONIDE 1 MG/G
0.1% CREAM TOPICAL BID
Qty: 80 | Refills: 3 | Status: ERX | COMMUNITY
Start: 2022-04-19

## 2022-04-19 RX ADMIN — TRIAMCINOLONE ACETONIDE 0.1%: 1 CREAM TOPICAL at 00:00

## 2022-04-19 ASSESSMENT — LOCATION SIMPLE DESCRIPTION DERM
LOCATION SIMPLE: LEFT PRETIBIAL REGION
LOCATION SIMPLE: RIGHT PRETIBIAL REGION
LOCATION SIMPLE: LEFT UPPER BACK

## 2022-04-19 ASSESSMENT — LOCATION DETAILED DESCRIPTION DERM
LOCATION DETAILED: LEFT SUPERIOR UPPER BACK
LOCATION DETAILED: RIGHT PROXIMAL PRETIBIAL REGION
LOCATION DETAILED: LEFT PROXIMAL PRETIBIAL REGION

## 2022-04-19 ASSESSMENT — LOCATION ZONE DERM
LOCATION ZONE: LEG
LOCATION ZONE: TRUNK

## 2022-04-19 ASSESSMENT — TOTAL NUMBER OF LESIONS: # OF LESIONS?: 1

## 2022-04-19 NOTE — PROCEDURE: PRESCRIPTION MEDICATION MANAGEMENT
Plan: re-assess at f/u  ? LN2 as appropriate
Render In Strict Bullet Format?: No
Detail Level: Zone
Initiate Treatment: triamcinolone bid to inflamed areas on legs

## 2022-04-19 NOTE — PROCEDURE: LIQUID NITROGEN
Post-Care Instructions: I reviewed with the patient in detail post-care instructions. Patient is to wear sunprotection, and avoid picking at any of the treated lesions. Pt may apply Vaseline to crusted or scabbing areas.
Render Post-Care Instructions In Note?: yes
Number Of Freeze-Thaw Cycles: 1 freeze-thaw cycle
Detail Level: Detailed
Consent: The patient's consent was obtained including but not limited to risks of crusting, scabbing, blistering, scarring, darker or lighter pigmentary change, recurrence, incomplete removal and infection.
Duration Of Freeze Thaw-Cycle (Seconds): 4
Render Note In Bullet Format When Appropriate: No

## 2022-07-22 NOTE — ASSESSMENT & PLAN NOTE
Cardiac monitor for arrhythmia  Trend cardiac enzymes  EKG p.r.n.  Add aspirin 81 mg daily  Nitroglycerin p.r.n.  Consult Cardiology     [facility] Approved    Filling pharmacy: [facility]    Pharmacy contacted - received paid claim.  Pharmacy will contact patient when medication is ready to be picked up.